# Patient Record
Sex: MALE | Race: WHITE | NOT HISPANIC OR LATINO | Employment: OTHER | URBAN - METROPOLITAN AREA
[De-identification: names, ages, dates, MRNs, and addresses within clinical notes are randomized per-mention and may not be internally consistent; named-entity substitution may affect disease eponyms.]

---

## 2020-01-29 ENCOUNTER — OFFICE VISIT (OUTPATIENT)
Dept: CARDIOLOGY | Facility: CLINIC | Age: 70
End: 2020-01-29

## 2020-01-29 VITALS
DIASTOLIC BLOOD PRESSURE: 68 MMHG | BODY MASS INDEX: 21.07 KG/M2 | SYSTOLIC BLOOD PRESSURE: 106 MMHG | WEIGHT: 159 LBS | HEIGHT: 73 IN | HEART RATE: 85 BPM

## 2020-01-29 DIAGNOSIS — I10 ESSENTIAL HYPERTENSION: Primary | ICD-10-CM

## 2020-01-29 DIAGNOSIS — I44.7 LBBB (LEFT BUNDLE BRANCH BLOCK): ICD-10-CM

## 2020-01-29 DIAGNOSIS — I50.22 CHRONIC SYSTOLIC CONGESTIVE HEART FAILURE, NYHA CLASS 1 (HCC): ICD-10-CM

## 2020-01-29 DIAGNOSIS — I95.9 HYPOTENSION, UNSPECIFIED HYPOTENSION TYPE: ICD-10-CM

## 2020-01-29 DIAGNOSIS — I48.0 PAROXYSMAL ATRIAL FIBRILLATION (HCC): ICD-10-CM

## 2020-01-29 DIAGNOSIS — E78.2 MIXED HYPERLIPIDEMIA: ICD-10-CM

## 2020-01-29 PROCEDURE — 99204 OFFICE O/P NEW MOD 45 MIN: CPT | Performed by: INTERNAL MEDICINE

## 2020-01-29 PROCEDURE — 93000 ELECTROCARDIOGRAM COMPLETE: CPT | Performed by: INTERNAL MEDICINE

## 2020-01-29 RX ORDER — LISINOPRIL 5 MG/1
1 TABLET ORAL DAILY
COMMUNITY
Start: 2019-12-17 | End: 2020-01-29

## 2020-01-29 RX ORDER — SERTRALINE HYDROCHLORIDE 100 MG/1
1 TABLET, FILM COATED ORAL DAILY
COMMUNITY
Start: 2020-01-20

## 2020-01-29 RX ORDER — SERTRALINE HYDROCHLORIDE 25 MG/1
1 TABLET, FILM COATED ORAL DAILY
COMMUNITY
Start: 2020-01-16 | End: 2020-01-29

## 2020-01-29 RX ORDER — MAGNESIUM OXIDE 400 MG/1
1 TABLET ORAL DAILY
COMMUNITY
Start: 2020-01-15

## 2020-01-29 RX ORDER — MIDODRINE HYDROCHLORIDE 5 MG/1
5 TABLET ORAL 2 TIMES DAILY
Qty: 60 TABLET | Refills: 3 | Status: SHIPPED | OUTPATIENT
Start: 2020-01-29 | End: 2021-02-04

## 2020-01-29 RX ORDER — METOPROLOL SUCCINATE 50 MG/1
1 TABLET, EXTENDED RELEASE ORAL DAILY
COMMUNITY
Start: 2020-01-14

## 2020-01-29 RX ORDER — ALBUTEROL SULFATE 90 UG/1
AEROSOL, METERED RESPIRATORY (INHALATION) TAKE AS DIRECTED
COMMUNITY
Start: 2019-12-17

## 2020-01-29 RX ORDER — ASPIRIN 81 MG/1
81 TABLET, CHEWABLE ORAL DAILY
COMMUNITY

## 2020-01-29 RX ORDER — THIAMINE MONONITRATE (VIT B1) 100 MG
100 TABLET ORAL DAILY
COMMUNITY

## 2020-01-29 RX ORDER — ATORVASTATIN CALCIUM 20 MG/1
1 TABLET, FILM COATED ORAL DAILY
COMMUNITY
Start: 2020-01-14

## 2020-01-29 NOTE — PROGRESS NOTES
Date of Office Visit: 2020  Encounter Provider: Dr.Syed Balderrama  Place of Service: [facility] CARDIOLOGY [place]  Patient Name: Viet Worley  :1950  Provider, No Known    Chief Complaint   Patient presents with   • Congestive Heart Failure     consult   • Hyperlipidemia   • Hypertension   • COPD   • Atrial Fibrillation     History of Present Illness:    I am pleased to see  in my office today as a new consultation.    As you know, patient is 70-year-old white gentleman whose past medical history is significant for hyperlipidemia, COPD, alcohol abuse, cardiomyopathy, paroxysmal atrial fibrillation, history of congestive heart failure, who came today to establish his cardiac care with me.    In 2017, patient was living in [place] and he lost his wife who .  Patient developed depression.  He did not take care of himself.  He was admitted in the hospital with congestive heart failure and his work-up showed cardiomyopathy with decreased LV function.  Subsequently patient stopped taking all the medication which was prescribed.  Patient binged himself with alcohol.  For more than a year, patient was having this lifestyle.  In 2019, he was admitted with volume overload and congestive heart failure in [facility] and was treated appropriately.  All of these records are not available.  This is reported by his daughter.  Patient is now living with his daughter.    At present patient does complain of shortness of breath.  Patient has cough and slight wheezing.  No chest pain.  No orthopnea or PND no JVD.  No leg edema.    EKG showed left bundle branch block with sinus rhythm.    Patient has extensive cardiac history but work-up is not available to me.  At this stage, I would recommend to get the record from other hospital.  I would start midodrine 5 mg twice daily for relative hypotension.  Blood pressure monitoring is recommended.  I would consider cardiac work-up including  echocardiogram in future if I do not get the records.        Past Medical History:   Diagnosis Date   • Atrial fibrillation (CMS/HCC)    • Benign essential HTN    • COPD (chronic obstructive pulmonary disease) (CMS/HCC)    • Heart attack (CMS/HCC)    • Hyperlipidemia, mixed          History reviewed. No pertinent surgical history.        Current Outpatient Medications:   •  aspirin 81 MG chewable tablet, Chew 81 mg Daily., Disp: , Rfl:   •  atorvastatin (LIPITOR) 20 MG tablet, 1 tablet Daily., Disp: , Rfl:   •  magnesium oxide (MAG-OX) 400 MG tablet, Take 1 tablet by mouth 2 (Two) Times a Day., Disp: , Rfl:   •  metoprolol succinate XL (TOPROL-XL) 50 MG 24 hr tablet, Take 1 tablet by mouth Daily., Disp: , Rfl:   •  sertraline (ZOLOFT) 100 MG tablet, 1 tablet Daily., Disp: , Rfl:   •  thiamine (VITAMIN B-1) 100 MG tablet, Take 100 mg by mouth Daily., Disp: , Rfl:   •  TRELEGY ELLIPTA 100-62.5-25 MCG/INH aerosol powder , 1 puff Daily., Disp: , Rfl:   •  VENTOLIN  (90 Base) MCG/ACT inhaler, Take As Directed., Disp: , Rfl:   •  midodrine (PROAMATINE) 5 MG tablet, Take 1 tablet by mouth 2 (Two) Times a Day., Disp: 60 tablet, Rfl: 3      Social History     Socioeconomic History   • Marital status:      Spouse name: Not on file   • Number of children: Not on file   • Years of education: Not on file   • Highest education level: Not on file   Tobacco Use   • Smoking status: Former Smoker   Substance and Sexual Activity   • Alcohol use: Not Currently   • Drug use: Not Currently   • Sexual activity: Defer         Review of Systems   Constitution: Negative for chills and fever.   HENT: Negative for ear discharge and nosebleeds.    Eyes: Negative for discharge and redness.   Cardiovascular: Negative for chest pain, orthopnea, palpitations, paroxysmal nocturnal dyspnea and syncope.   Respiratory: Positive for cough and shortness of breath. Negative for wheezing.    Endocrine: Negative for heat intolerance.   Skin:  "Negative for rash.   Musculoskeletal: Positive for arthritis and joint pain. Negative for myalgias.   Gastrointestinal: Negative for abdominal pain, melena, nausea and vomiting.   Genitourinary: Negative for dysuria and hematuria.   Neurological: Negative for dizziness, light-headedness, numbness and tremors.   Psychiatric/Behavioral: Negative for depression. The patient is not nervous/anxious.        Procedures      ECG 12 Lead  Date/Time: 1/29/2020 12:44 PM  Performed by: Mikey Balderrama MD  Authorized by: Mikey Balderrama MD   Previous ECG: no previous ECG available  Rhythm: sinus rhythm  Conduction: left bundle branch block    Clinical impression: normal ECG            ECG 12 Lead    (Results Pending)           Objective:    /68   Pulse 85   Ht 185.4 cm (73\")   Wt 72.1 kg (159 lb)   BMI 20.98 kg/m²         Physical Exam   Constitutional: He is oriented to person, place, and time. He appears well-developed and well-nourished.   HENT:   Head: Normocephalic and atraumatic.   Eyes: No scleral icterus.   Neck: No thyromegaly present.   Cardiovascular: Normal rate, regular rhythm and normal heart sounds. Exam reveals no gallop and no friction rub.   No murmur heard.  Pulmonary/Chest: Effort normal. No respiratory distress. He has wheezes. He has no rales.   Abdominal: There is no tenderness.   Musculoskeletal: He exhibits no edema.   Lymphadenopathy:     He has no cervical adenopathy.   Neurological: He is alert and oriented to person, place, and time.   Skin: No rash noted. No erythema.   Psychiatric: He has a normal mood and affect.           Assessment:       Diagnosis Plan   1. Essential hypertension  ECG 12 Lead    midodrine (PROAMATINE) 5 MG tablet   2. Mixed hyperlipidemia  ECG 12 Lead    midodrine (PROAMATINE) 5 MG tablet   3. Paroxysmal atrial fibrillation (CMS/HCC)  ECG 12 Lead    midodrine (PROAMATINE) 5 MG tablet   4. Chronic systolic congestive heart failure, NYHA class 1 (CMS/HCC)  ECG 12 Lead    " midodrine (PROAMATINE) 5 MG tablet   5. LBBB (left bundle branch block)  ECG 12 Lead    midodrine (PROAMATINE) 5 MG tablet   6. Hypotension, unspecified hypotension type  midodrine (PROAMATINE) 5 MG tablet            Plan:       Start midodrine and monitor the blood pressure at home.  I will try to get the record from other hospital.  I advised family that patient probably would be benefited with pulmonary follow-up.

## 2021-02-03 ENCOUNTER — APPOINTMENT (OUTPATIENT)
Dept: GENERAL RADIOLOGY | Facility: HOSPITAL | Age: 71
End: 2021-02-03

## 2021-02-03 ENCOUNTER — APPOINTMENT (OUTPATIENT)
Dept: CT IMAGING | Facility: HOSPITAL | Age: 71
End: 2021-02-03

## 2021-02-03 ENCOUNTER — HOSPITAL ENCOUNTER (INPATIENT)
Facility: HOSPITAL | Age: 71
LOS: 1 days | Discharge: HOME | End: 2021-02-05
Attending: EMERGENCY MEDICINE | Admitting: INTERNAL MEDICINE

## 2021-02-03 DIAGNOSIS — J96.01 ACUTE RESPIRATORY FAILURE WITH HYPOXEMIA (HCC): ICD-10-CM

## 2021-02-03 DIAGNOSIS — J18.9 PNEUMONIA OF RIGHT LOWER LOBE DUE TO INFECTIOUS ORGANISM: Primary | ICD-10-CM

## 2021-02-03 LAB
ALBUMIN SERPL-MCNC: 4.5 G/DL (ref 3.5–5.2)
ALBUMIN/GLOB SERPL: 1.2 G/DL
ALP SERPL-CCNC: 95 U/L (ref 39–117)
ALT SERPL W P-5'-P-CCNC: 14 U/L (ref 1–41)
ANION GAP SERPL CALCULATED.3IONS-SCNC: 13 MMOL/L (ref 5–15)
APTT PPP: 23.1 SECONDS (ref 24–31)
ARTERIAL PATENCY WRIST A: POSITIVE
AST SERPL-CCNC: 22 U/L (ref 1–40)
ATMOSPHERIC PRESS: ABNORMAL MM[HG]
BASE EXCESS BLDA CALC-SCNC: 2.4 MMOL/L (ref 0–3)
BASOPHILS # BLD AUTO: 0.1 10*3/MM3 (ref 0–0.2)
BASOPHILS NFR BLD AUTO: 0.8 % (ref 0–1.5)
BDY SITE: ABNORMAL
BILIRUB SERPL-MCNC: 0.5 MG/DL (ref 0–1.2)
BUN SERPL-MCNC: 10 MG/DL (ref 8–23)
BUN/CREAT SERPL: 10.8 (ref 7–25)
CALCIUM SPEC-SCNC: 9.4 MG/DL (ref 8.6–10.5)
CHLORIDE SERPL-SCNC: 98 MMOL/L (ref 98–107)
CO2 BLDA-SCNC: 29.4 MMOL/L (ref 22–29)
CO2 SERPL-SCNC: 26 MMOL/L (ref 22–29)
CREAT SERPL-MCNC: 0.93 MG/DL (ref 0.76–1.27)
D DIMER PPP FEU-MCNC: 2.37 MG/L (FEU) (ref 0–0.59)
D-LACTATE SERPL-SCNC: 3 MMOL/L (ref 0.5–2)
DEPRECATED RDW RBC AUTO: 45.1 FL (ref 37–54)
EOSINOPHIL # BLD AUTO: 0.1 10*3/MM3 (ref 0–0.4)
EOSINOPHIL NFR BLD AUTO: 0.6 % (ref 0.3–6.2)
ERYTHROCYTE [DISTWIDTH] IN BLOOD BY AUTOMATED COUNT: 15 % (ref 12.3–15.4)
ETHANOL UR QL: <0.01 %
GFR SERPL CREATININE-BSD FRML MDRD: 80 ML/MIN/1.73
GLOBULIN UR ELPH-MCNC: 3.7 GM/DL
GLUCOSE SERPL-MCNC: 100 MG/DL (ref 65–99)
HCO3 BLDA-SCNC: 28 MMOL/L (ref 21–28)
HCT VFR BLD AUTO: 46.9 % (ref 37.5–51)
HEMODILUTION: NO
HGB BLD-MCNC: 15.3 G/DL (ref 13–17.7)
HOLD SPECIMEN: NORMAL
HOLD SPECIMEN: NORMAL
INHALED O2 CONCENTRATION: 100 %
INR PPP: 0.95 (ref 0.93–1.1)
LIPASE SERPL-CCNC: 27 U/L (ref 13–60)
LYMPHOCYTES # BLD AUTO: 2.9 10*3/MM3 (ref 0.7–3.1)
LYMPHOCYTES NFR BLD AUTO: 20.6 % (ref 19.6–45.3)
MAGNESIUM SERPL-MCNC: 2 MG/DL (ref 1.6–2.4)
MCH RBC QN AUTO: 27.9 PG (ref 26.6–33)
MCHC RBC AUTO-ENTMCNC: 32.7 G/DL (ref 31.5–35.7)
MCV RBC AUTO: 85.3 FL (ref 79–97)
MODALITY: ABNORMAL
MONOCYTES # BLD AUTO: 0.6 10*3/MM3 (ref 0.1–0.9)
MONOCYTES NFR BLD AUTO: 4.5 % (ref 5–12)
NEUTROPHILS NFR BLD AUTO: 10.5 10*3/MM3 (ref 1.7–7)
NEUTROPHILS NFR BLD AUTO: 73.5 % (ref 42.7–76)
NRBC BLD AUTO-RTO: 0 /100 WBC (ref 0–0.2)
NT-PROBNP SERPL-MCNC: 347.5 PG/ML (ref 0–900)
PCO2 BLDA: 45.2 MM HG (ref 35–48)
PH BLDA: 7.4 PH UNITS (ref 7.35–7.45)
PLATELET # BLD AUTO: 229 10*3/MM3 (ref 140–450)
PMV BLD AUTO: 8.7 FL (ref 6–12)
PO2 BLDA: 60.7 MM HG (ref 83–108)
POTASSIUM SERPL-SCNC: 4.7 MMOL/L (ref 3.5–5.2)
PROCALCITONIN SERPL-MCNC: 0.04 NG/ML (ref 0–0.25)
PROT SERPL-MCNC: 8.2 G/DL (ref 6–8.5)
PROTHROMBIN TIME: 10.5 SECONDS (ref 9.6–11.7)
RBC # BLD AUTO: 5.5 10*6/MM3 (ref 4.14–5.8)
SAO2 % BLDCOA: 90.6 % (ref 94–98)
SODIUM SERPL-SCNC: 137 MMOL/L (ref 136–145)
TROPONIN T SERPL-MCNC: <0.01 NG/ML (ref 0–0.03)
WBC # BLD AUTO: 14.3 10*3/MM3 (ref 3.4–10.8)
WHOLE BLOOD HOLD SPECIMEN: NORMAL
WHOLE BLOOD HOLD SPECIMEN: NORMAL

## 2021-02-03 PROCEDURE — 71275 CT ANGIOGRAPHY CHEST: CPT

## 2021-02-03 PROCEDURE — 83880 ASSAY OF NATRIURETIC PEPTIDE: CPT | Performed by: EMERGENCY MEDICINE

## 2021-02-03 PROCEDURE — 83605 ASSAY OF LACTIC ACID: CPT

## 2021-02-03 PROCEDURE — 85379 FIBRIN DEGRADATION QUANT: CPT | Performed by: EMERGENCY MEDICINE

## 2021-02-03 PROCEDURE — 99285 EMERGENCY DEPT VISIT HI MDM: CPT

## 2021-02-03 PROCEDURE — 84484 ASSAY OF TROPONIN QUANT: CPT | Performed by: EMERGENCY MEDICINE

## 2021-02-03 PROCEDURE — 84145 PROCALCITONIN (PCT): CPT | Performed by: EMERGENCY MEDICINE

## 2021-02-03 PROCEDURE — 83690 ASSAY OF LIPASE: CPT | Performed by: EMERGENCY MEDICINE

## 2021-02-03 PROCEDURE — 93005 ELECTROCARDIOGRAM TRACING: CPT | Performed by: EMERGENCY MEDICINE

## 2021-02-03 PROCEDURE — 85610 PROTHROMBIN TIME: CPT | Performed by: EMERGENCY MEDICINE

## 2021-02-03 PROCEDURE — 25010000002 METHYLPREDNISOLONE PER 125 MG: Performed by: EMERGENCY MEDICINE

## 2021-02-03 PROCEDURE — 25010000002 ONDANSETRON PER 1 MG: Performed by: EMERGENCY MEDICINE

## 2021-02-03 PROCEDURE — 82077 ASSAY SPEC XCP UR&BREATH IA: CPT | Performed by: EMERGENCY MEDICINE

## 2021-02-03 PROCEDURE — 25010000002 PIPERACILLIN SOD-TAZOBACTAM PER 1 G: Performed by: EMERGENCY MEDICINE

## 2021-02-03 PROCEDURE — 87040 BLOOD CULTURE FOR BACTERIA: CPT | Performed by: EMERGENCY MEDICINE

## 2021-02-03 PROCEDURE — 83735 ASSAY OF MAGNESIUM: CPT | Performed by: EMERGENCY MEDICINE

## 2021-02-03 PROCEDURE — 82803 BLOOD GASES ANY COMBINATION: CPT

## 2021-02-03 PROCEDURE — 71045 X-RAY EXAM CHEST 1 VIEW: CPT

## 2021-02-03 PROCEDURE — 36600 WITHDRAWAL OF ARTERIAL BLOOD: CPT

## 2021-02-03 PROCEDURE — 85025 COMPLETE CBC W/AUTO DIFF WBC: CPT | Performed by: EMERGENCY MEDICINE

## 2021-02-03 PROCEDURE — 0202U NFCT DS 22 TRGT SARS-COV-2: CPT | Performed by: EMERGENCY MEDICINE

## 2021-02-03 PROCEDURE — 85730 THROMBOPLASTIN TIME PARTIAL: CPT | Performed by: EMERGENCY MEDICINE

## 2021-02-03 PROCEDURE — 94640 AIRWAY INHALATION TREATMENT: CPT

## 2021-02-03 PROCEDURE — 80053 COMPREHEN METABOLIC PANEL: CPT | Performed by: EMERGENCY MEDICINE

## 2021-02-03 PROCEDURE — 25010000002 VANCOMYCIN 10 G RECONSTITUTED SOLUTION: Performed by: EMERGENCY MEDICINE

## 2021-02-03 RX ORDER — ACETAMINOPHEN 500 MG
1000 TABLET ORAL ONCE
Status: COMPLETED | OUTPATIENT
Start: 2021-02-03 | End: 2021-02-03

## 2021-02-03 RX ORDER — ALBUTEROL SULFATE 90 UG/1
2 AEROSOL, METERED RESPIRATORY (INHALATION) ONCE
Status: COMPLETED | OUTPATIENT
Start: 2021-02-03 | End: 2021-02-03

## 2021-02-03 RX ORDER — METHYLPREDNISOLONE SODIUM SUCCINATE 125 MG/2ML
125 INJECTION, POWDER, LYOPHILIZED, FOR SOLUTION INTRAMUSCULAR; INTRAVENOUS ONCE
Status: COMPLETED | OUTPATIENT
Start: 2021-02-03 | End: 2021-02-03

## 2021-02-03 RX ORDER — SODIUM CHLORIDE 0.9 % (FLUSH) 0.9 %
10 SYRINGE (ML) INJECTION AS NEEDED
Status: DISCONTINUED | OUTPATIENT
Start: 2021-02-03 | End: 2021-02-05 | Stop reason: HOSPADM

## 2021-02-03 RX ORDER — ALBUTEROL SULFATE 2.5 MG/3ML
2.5 SOLUTION RESPIRATORY (INHALATION) ONCE
Status: DISCONTINUED | OUTPATIENT
Start: 2021-02-03 | End: 2021-02-03

## 2021-02-03 RX ORDER — ONDANSETRON 2 MG/ML
4 INJECTION INTRAMUSCULAR; INTRAVENOUS ONCE
Status: COMPLETED | OUTPATIENT
Start: 2021-02-03 | End: 2021-02-03

## 2021-02-03 RX ADMIN — VANCOMYCIN HYDROCHLORIDE 1500 MG: 10 INJECTION, POWDER, LYOPHILIZED, FOR SOLUTION INTRAVENOUS at 23:18

## 2021-02-03 RX ADMIN — SODIUM CHLORIDE 2301 ML: 9 INJECTION, SOLUTION INTRAVENOUS at 23:33

## 2021-02-03 RX ADMIN — ALBUTEROL SULFATE 2 PUFF: 90 AEROSOL, METERED RESPIRATORY (INHALATION) at 23:09

## 2021-02-03 RX ADMIN — ONDANSETRON 4 MG: 2 INJECTION, SOLUTION INTRAMUSCULAR; INTRAVENOUS at 23:07

## 2021-02-03 RX ADMIN — PIPERACILLIN AND TAZOBACTAM 3.38 G: 3; .375 INJECTION, POWDER, LYOPHILIZED, FOR SOLUTION INTRAVENOUS at 23:07

## 2021-02-03 RX ADMIN — ACETAMINOPHEN 1000 MG: 500 TABLET, FILM COATED ORAL at 23:07

## 2021-02-03 RX ADMIN — METHYLPREDNISOLONE SODIUM SUCCINATE 125 MG: 125 INJECTION, POWDER, FOR SOLUTION INTRAMUSCULAR; INTRAVENOUS at 23:07

## 2021-02-04 PROBLEM — I10 ESSENTIAL HYPERTENSION: Chronic | Status: ACTIVE | Noted: 2020-01-29

## 2021-02-04 PROBLEM — I50.22 CHRONIC SYSTOLIC CONGESTIVE HEART FAILURE, NYHA CLASS 1 (HCC): Chronic | Status: ACTIVE | Noted: 2020-01-29

## 2021-02-04 PROBLEM — U07.1 COVID-19 VIRUS DETECTED: Status: ACTIVE | Noted: 2021-02-04

## 2021-02-04 PROBLEM — J69.0 ASPIRATION PNEUMONIA DUE TO VOMITUS (HCC): Status: ACTIVE | Noted: 2021-02-04

## 2021-02-04 PROBLEM — J44.9 COPD (CHRONIC OBSTRUCTIVE PULMONARY DISEASE) (HCC): Chronic | Status: ACTIVE | Noted: 2021-02-04

## 2021-02-04 PROBLEM — F41.8 ANXIETY ASSOCIATED WITH DEPRESSION: Chronic | Status: ACTIVE | Noted: 2021-02-04

## 2021-02-04 PROBLEM — E78.2 MIXED HYPERLIPIDEMIA: Chronic | Status: ACTIVE | Noted: 2020-01-29

## 2021-02-04 PROBLEM — I48.0 PAROXYSMAL ATRIAL FIBRILLATION (HCC): Chronic | Status: ACTIVE | Noted: 2020-01-29

## 2021-02-04 LAB
ANION GAP SERPL CALCULATED.3IONS-SCNC: 9 MMOL/L (ref 5–15)
B PARAPERT DNA SPEC QL NAA+PROBE: NOT DETECTED
B PERT DNA SPEC QL NAA+PROBE: NOT DETECTED
BASOPHILS # BLD AUTO: 0 10*3/MM3 (ref 0–0.2)
BASOPHILS NFR BLD AUTO: 0.3 % (ref 0–1.5)
BILIRUB UR QL STRIP: NEGATIVE
BUN SERPL-MCNC: 9 MG/DL (ref 8–23)
BUN/CREAT SERPL: 10.8 (ref 7–25)
C PNEUM DNA NPH QL NAA+NON-PROBE: NOT DETECTED
CALCIUM SPEC-SCNC: 8 MG/DL (ref 8.6–10.5)
CHLORIDE SERPL-SCNC: 105 MMOL/L (ref 98–107)
CLARITY UR: CLEAR
CO2 SERPL-SCNC: 23 MMOL/L (ref 22–29)
COLOR UR: YELLOW
CREAT SERPL-MCNC: 0.83 MG/DL (ref 0.76–1.27)
D-LACTATE SERPL-SCNC: 2.6 MMOL/L (ref 0.5–2)
DEPRECATED RDW RBC AUTO: 45.1 FL (ref 37–54)
EOSINOPHIL # BLD AUTO: 0 10*3/MM3 (ref 0–0.4)
EOSINOPHIL NFR BLD AUTO: 0.1 % (ref 0.3–6.2)
ERYTHROCYTE [DISTWIDTH] IN BLOOD BY AUTOMATED COUNT: 15.2 % (ref 12.3–15.4)
FLUAV SUBTYP SPEC NAA+PROBE: NOT DETECTED
FLUBV RNA ISLT QL NAA+PROBE: NOT DETECTED
GFR SERPL CREATININE-BSD FRML MDRD: 91 ML/MIN/1.73
GLUCOSE SERPL-MCNC: 117 MG/DL (ref 65–99)
GLUCOSE UR STRIP-MCNC: NEGATIVE MG/DL
HADV DNA SPEC NAA+PROBE: NOT DETECTED
HCOV 229E RNA SPEC QL NAA+PROBE: NOT DETECTED
HCOV HKU1 RNA SPEC QL NAA+PROBE: NOT DETECTED
HCOV NL63 RNA SPEC QL NAA+PROBE: NOT DETECTED
HCOV OC43 RNA SPEC QL NAA+PROBE: NOT DETECTED
HCT VFR BLD AUTO: 39.5 % (ref 37.5–51)
HGB BLD-MCNC: 13 G/DL (ref 13–17.7)
HGB UR QL STRIP.AUTO: NEGATIVE
HMPV RNA NPH QL NAA+NON-PROBE: NOT DETECTED
HPIV1 RNA SPEC QL NAA+PROBE: NOT DETECTED
HPIV2 RNA SPEC QL NAA+PROBE: NOT DETECTED
HPIV3 RNA NPH QL NAA+PROBE: NOT DETECTED
HPIV4 P GENE NPH QL NAA+PROBE: NOT DETECTED
KETONES UR QL STRIP: ABNORMAL
L PNEUMO1 AG UR QL IA: NEGATIVE
LACTATE HOLD SPECIMEN: NORMAL
LEUKOCYTE ESTERASE UR QL STRIP.AUTO: NEGATIVE
LYMPHOCYTES # BLD AUTO: 0.5 10*3/MM3 (ref 0.7–3.1)
LYMPHOCYTES NFR BLD AUTO: 4 % (ref 19.6–45.3)
M PNEUMO IGG SER IA-ACNC: NOT DETECTED
MCH RBC QN AUTO: 27.8 PG (ref 26.6–33)
MCHC RBC AUTO-ENTMCNC: 33 G/DL (ref 31.5–35.7)
MCV RBC AUTO: 84.1 FL (ref 79–97)
MONOCYTES # BLD AUTO: 0.2 10*3/MM3 (ref 0.1–0.9)
MONOCYTES NFR BLD AUTO: 2 % (ref 5–12)
MRSA DNA SPEC QL NAA+PROBE: NORMAL
NEUTROPHILS NFR BLD AUTO: 11.3 10*3/MM3 (ref 1.7–7)
NEUTROPHILS NFR BLD AUTO: 93.6 % (ref 42.7–76)
NITRITE UR QL STRIP: NEGATIVE
NRBC BLD AUTO-RTO: 0 /100 WBC (ref 0–0.2)
PH UR STRIP.AUTO: 5.5 [PH] (ref 5–8)
PLATELET # BLD AUTO: 180 10*3/MM3 (ref 140–450)
PMV BLD AUTO: 8.6 FL (ref 6–12)
POTASSIUM SERPL-SCNC: 4.1 MMOL/L (ref 3.5–5.2)
PROT UR QL STRIP: NEGATIVE
QT INTERVAL: 340 MS
RBC # BLD AUTO: 4.7 10*6/MM3 (ref 4.14–5.8)
RHINOVIRUS RNA SPEC NAA+PROBE: NOT DETECTED
RSV RNA NPH QL NAA+NON-PROBE: NOT DETECTED
S PNEUM AG SPEC QL LA: NEGATIVE
SARS-COV-2 RNA NPH QL NAA+NON-PROBE: NOT DETECTED
SODIUM SERPL-SCNC: 137 MMOL/L (ref 136–145)
SP GR UR STRIP: 1.05 (ref 1–1.03)
UROBILINOGEN UR QL STRIP: ABNORMAL
WBC # BLD AUTO: 12.1 10*3/MM3 (ref 3.4–10.8)

## 2021-02-04 PROCEDURE — 25010000002 PIPERACILLIN SOD-TAZOBACTAM PER 1 G: Performed by: STUDENT IN AN ORGANIZED HEALTH CARE EDUCATION/TRAINING PROGRAM

## 2021-02-04 PROCEDURE — 87899 AGENT NOS ASSAY W/OPTIC: CPT | Performed by: STUDENT IN AN ORGANIZED HEALTH CARE EDUCATION/TRAINING PROGRAM

## 2021-02-04 PROCEDURE — 94799 UNLISTED PULMONARY SVC/PX: CPT

## 2021-02-04 PROCEDURE — 25010000003 AMPICILLIN-SULBACTAM PER 1.5 G: Performed by: INTERNAL MEDICINE

## 2021-02-04 PROCEDURE — 87641 MR-STAPH DNA AMP PROBE: CPT | Performed by: STUDENT IN AN ORGANIZED HEALTH CARE EDUCATION/TRAINING PROGRAM

## 2021-02-04 PROCEDURE — 85025 COMPLETE CBC W/AUTO DIFF WBC: CPT | Performed by: STUDENT IN AN ORGANIZED HEALTH CARE EDUCATION/TRAINING PROGRAM

## 2021-02-04 PROCEDURE — 81003 URINALYSIS AUTO W/O SCOPE: CPT | Performed by: EMERGENCY MEDICINE

## 2021-02-04 PROCEDURE — 83605 ASSAY OF LACTIC ACID: CPT

## 2021-02-04 PROCEDURE — 99223 1ST HOSP IP/OBS HIGH 75: CPT | Performed by: INTERNAL MEDICINE

## 2021-02-04 PROCEDURE — 94640 AIRWAY INHALATION TREATMENT: CPT

## 2021-02-04 PROCEDURE — 25010000002 ENOXAPARIN PER 10 MG: Performed by: STUDENT IN AN ORGANIZED HEALTH CARE EDUCATION/TRAINING PROGRAM

## 2021-02-04 PROCEDURE — 0 IOPAMIDOL PER 1 ML: Performed by: EMERGENCY MEDICINE

## 2021-02-04 PROCEDURE — 80048 BASIC METABOLIC PNL TOTAL CA: CPT | Performed by: STUDENT IN AN ORGANIZED HEALTH CARE EDUCATION/TRAINING PROGRAM

## 2021-02-04 RX ORDER — ATORVASTATIN CALCIUM 20 MG/1
20 TABLET, FILM COATED ORAL DAILY
Status: DISCONTINUED | OUTPATIENT
Start: 2021-02-04 | End: 2021-02-05 | Stop reason: HOSPADM

## 2021-02-04 RX ORDER — DOCUSATE SODIUM 100 MG/1
100 CAPSULE, LIQUID FILLED ORAL 2 TIMES DAILY PRN
Status: DISCONTINUED | OUTPATIENT
Start: 2021-02-04 | End: 2021-02-05 | Stop reason: HOSPADM

## 2021-02-04 RX ORDER — ASPIRIN 81 MG/1
81 TABLET, CHEWABLE ORAL DAILY
Status: DISCONTINUED | OUTPATIENT
Start: 2021-02-04 | End: 2021-02-05 | Stop reason: HOSPADM

## 2021-02-04 RX ORDER — MAGNESIUM SULFATE HEPTAHYDRATE 40 MG/ML
2 INJECTION, SOLUTION INTRAVENOUS AS NEEDED
Status: DISCONTINUED | OUTPATIENT
Start: 2021-02-04 | End: 2021-02-05 | Stop reason: HOSPADM

## 2021-02-04 RX ORDER — POTASSIUM CHLORIDE 1.5 G/1.77G
40 POWDER, FOR SOLUTION ORAL AS NEEDED
Status: DISCONTINUED | OUTPATIENT
Start: 2021-02-04 | End: 2021-02-05 | Stop reason: HOSPADM

## 2021-02-04 RX ORDER — CHOLECALCIFEROL (VITAMIN D3) 125 MCG
5 CAPSULE ORAL NIGHTLY PRN
Status: DISCONTINUED | OUTPATIENT
Start: 2021-02-04 | End: 2021-02-05 | Stop reason: HOSPADM

## 2021-02-04 RX ORDER — SODIUM CHLORIDE 0.9 % (FLUSH) 0.9 %
10 SYRINGE (ML) INJECTION EVERY 12 HOURS SCHEDULED
Status: DISCONTINUED | OUTPATIENT
Start: 2021-02-04 | End: 2021-02-05 | Stop reason: HOSPADM

## 2021-02-04 RX ORDER — ACETAMINOPHEN 325 MG/1
650 TABLET ORAL EVERY 4 HOURS PRN
Status: DISCONTINUED | OUTPATIENT
Start: 2021-02-04 | End: 2021-02-05 | Stop reason: HOSPADM

## 2021-02-04 RX ORDER — METOPROLOL SUCCINATE 50 MG/1
50 TABLET, EXTENDED RELEASE ORAL DAILY
Status: DISCONTINUED | OUTPATIENT
Start: 2021-02-04 | End: 2021-02-05 | Stop reason: HOSPADM

## 2021-02-04 RX ORDER — ONDANSETRON 4 MG/1
4 TABLET, FILM COATED ORAL EVERY 6 HOURS PRN
Status: DISCONTINUED | OUTPATIENT
Start: 2021-02-04 | End: 2021-02-05 | Stop reason: HOSPADM

## 2021-02-04 RX ORDER — MAGNESIUM SULFATE 1 G/100ML
1 INJECTION INTRAVENOUS AS NEEDED
Status: DISCONTINUED | OUTPATIENT
Start: 2021-02-04 | End: 2021-02-05 | Stop reason: HOSPADM

## 2021-02-04 RX ORDER — POTASSIUM CHLORIDE 20 MEQ/1
40 TABLET, EXTENDED RELEASE ORAL AS NEEDED
Status: DISCONTINUED | OUTPATIENT
Start: 2021-02-04 | End: 2021-02-05 | Stop reason: HOSPADM

## 2021-02-04 RX ORDER — NITROGLYCERIN 0.4 MG/1
0.4 TABLET SUBLINGUAL
Status: DISCONTINUED | OUTPATIENT
Start: 2021-02-04 | End: 2021-02-05 | Stop reason: HOSPADM

## 2021-02-04 RX ORDER — IPRATROPIUM BROMIDE AND ALBUTEROL SULFATE 2.5; .5 MG/3ML; MG/3ML
3 SOLUTION RESPIRATORY (INHALATION) EVERY 4 HOURS PRN
Status: DISCONTINUED | OUTPATIENT
Start: 2021-02-04 | End: 2021-02-05 | Stop reason: HOSPADM

## 2021-02-04 RX ORDER — ONDANSETRON 2 MG/ML
4 INJECTION INTRAMUSCULAR; INTRAVENOUS EVERY 6 HOURS PRN
Status: DISCONTINUED | OUTPATIENT
Start: 2021-02-04 | End: 2021-02-05 | Stop reason: HOSPADM

## 2021-02-04 RX ORDER — ALBUTEROL SULFATE 90 UG/1
2 AEROSOL, METERED RESPIRATORY (INHALATION) EVERY 6 HOURS PRN
Status: DISCONTINUED | OUTPATIENT
Start: 2021-02-04 | End: 2021-02-05 | Stop reason: HOSPADM

## 2021-02-04 RX ORDER — ACETAMINOPHEN 160 MG/5ML
650 SOLUTION ORAL EVERY 4 HOURS PRN
Status: DISCONTINUED | OUTPATIENT
Start: 2021-02-04 | End: 2021-02-05 | Stop reason: HOSPADM

## 2021-02-04 RX ORDER — POTASSIUM CHLORIDE 7.45 MG/ML
10 INJECTION INTRAVENOUS
Status: DISCONTINUED | OUTPATIENT
Start: 2021-02-04 | End: 2021-02-05 | Stop reason: HOSPADM

## 2021-02-04 RX ORDER — ACETAMINOPHEN 650 MG/1
650 SUPPOSITORY RECTAL EVERY 4 HOURS PRN
Status: DISCONTINUED | OUTPATIENT
Start: 2021-02-04 | End: 2021-02-05 | Stop reason: HOSPADM

## 2021-02-04 RX ORDER — BUDESONIDE AND FORMOTEROL FUMARATE DIHYDRATE 80; 4.5 UG/1; UG/1
2 AEROSOL RESPIRATORY (INHALATION)
Status: DISCONTINUED | OUTPATIENT
Start: 2021-02-04 | End: 2021-02-05 | Stop reason: HOSPADM

## 2021-02-04 RX ORDER — SERTRALINE HYDROCHLORIDE 100 MG/1
100 TABLET, FILM COATED ORAL DAILY
Status: DISCONTINUED | OUTPATIENT
Start: 2021-02-04 | End: 2021-02-05 | Stop reason: HOSPADM

## 2021-02-04 RX ORDER — GUAIFENESIN 600 MG/1
1200 TABLET, EXTENDED RELEASE ORAL EVERY 12 HOURS SCHEDULED
Status: DISCONTINUED | OUTPATIENT
Start: 2021-02-04 | End: 2021-02-05 | Stop reason: HOSPADM

## 2021-02-04 RX ORDER — SODIUM CHLORIDE 0.9 % (FLUSH) 0.9 %
10 SYRINGE (ML) INJECTION AS NEEDED
Status: DISCONTINUED | OUTPATIENT
Start: 2021-02-04 | End: 2021-02-05 | Stop reason: HOSPADM

## 2021-02-04 RX ADMIN — GUAIFENESIN 1200 MG: 600 TABLET, EXTENDED RELEASE ORAL at 09:03

## 2021-02-04 RX ADMIN — IPRATROPIUM BROMIDE 0.5 MG: 0.5 SOLUTION RESPIRATORY (INHALATION) at 12:46

## 2021-02-04 RX ADMIN — Medication: at 12:35

## 2021-02-04 RX ADMIN — SERTRALINE HYDROCHLORIDE 100 MG: 100 TABLET ORAL at 08:58

## 2021-02-04 RX ADMIN — Medication 10 ML: at 20:20

## 2021-02-04 RX ADMIN — Medication 10 ML: at 03:51

## 2021-02-04 RX ADMIN — IPRATROPIUM BROMIDE 0.5 MG: 0.5 SOLUTION RESPIRATORY (INHALATION) at 08:22

## 2021-02-04 RX ADMIN — BUDESONIDE AND FORMOTEROL FUMARATE DIHYDRATE 2 PUFF: 80; 4.5 AEROSOL RESPIRATORY (INHALATION) at 08:22

## 2021-02-04 RX ADMIN — ENOXAPARIN SODIUM 40 MG: 40 INJECTION SUBCUTANEOUS at 15:52

## 2021-02-04 RX ADMIN — METOPROLOL SUCCINATE 50 MG: 50 TABLET, EXTENDED RELEASE ORAL at 08:58

## 2021-02-04 RX ADMIN — Medication 1 APPLICATION: at 20:20

## 2021-02-04 RX ADMIN — GUAIFENESIN 1200 MG: 600 TABLET, EXTENDED RELEASE ORAL at 20:20

## 2021-02-04 RX ADMIN — SODIUM CHLORIDE 3 G: 900 INJECTION, SOLUTION INTRAVENOUS at 10:56

## 2021-02-04 RX ADMIN — IOPAMIDOL 100 ML: 755 INJECTION, SOLUTION INTRAVENOUS at 00:15

## 2021-02-04 RX ADMIN — IPRATROPIUM BROMIDE 0.5 MG: 0.5 SOLUTION RESPIRATORY (INHALATION) at 23:13

## 2021-02-04 RX ADMIN — Medication 10 ML: at 08:59

## 2021-02-04 RX ADMIN — SODIUM CHLORIDE 3 G: 900 INJECTION, SOLUTION INTRAVENOUS at 15:53

## 2021-02-04 RX ADMIN — GUAIFENESIN 1200 MG: 600 TABLET, EXTENDED RELEASE ORAL at 03:19

## 2021-02-04 RX ADMIN — SODIUM CHLORIDE 3 G: 900 INJECTION, SOLUTION INTRAVENOUS at 21:16

## 2021-02-04 RX ADMIN — ASPIRIN 81 MG CHEWABLE TABLET 81 MG: 81 TABLET CHEWABLE at 08:58

## 2021-02-04 RX ADMIN — PIPERACILLIN AND TAZOBACTAM 3.38 G: 3; .375 INJECTION, POWDER, LYOPHILIZED, FOR SOLUTION INTRAVENOUS at 04:03

## 2021-02-04 RX ADMIN — ATORVASTATIN CALCIUM 20 MG: 20 TABLET, FILM COATED ORAL at 08:58

## 2021-02-04 NOTE — PLAN OF CARE
Goal Outcome Evaluation:  Plan of Care Reviewed With: patient  Progress: improving  Outcome Summary: pt is on 5L n.c.with sat @ 97%, pt is resting in his bed with no complaints of pain or SOA

## 2021-02-04 NOTE — PROGRESS NOTES
"Pharmacy Antimicrobial Dosing Service    Subjective:  Viet Worley is a 71 y.o.male admitted with PNA. Pharmacy has been consulted to dose Vancomycin for possible PNA.        Assessment/Plan    1. Day #1 Vancomycin: Goal -600 mcg*h/mL. Vanc 1500mg (~19.5 mg/kg TBW) given in ED. Will schedule vanc 1000mg (~13 mg/kg TBW) q12h. Will obtain levels prior to fourth dose. MRSA nasal swab ordered for PNA.     Peak 2/5 at 0300 and trough 2/5 at 1000.    2. Day # 1 Piperacillin/Tazobactam: 3.375g IV q8h for estCrCl > 20 mL/min.    Will continue to monitor drug levels, renal function, culture and sensitivities, and patient clinical status.       Objective:  Relevant clinical data and objective history reviewed:  177.8 cm (70\")   76.7 kg (169 lb)   Ideal body weight: 73 kg (160 lb 15 oz)  Adjusted ideal body weight: 74.5 kg (164 lb 2.6 oz)  Body mass index is 24.25 kg/m².        Results from last 7 days   Lab Units 02/03/21  2241   CREATININE mg/dL 0.93     Estimated Creatinine Clearance: 77.9 mL/min (by C-G formula based on SCr of 0.93 mg/dL).  No intake/output data recorded.    Results from last 7 days   Lab Units 02/03/21  2241   WBC 10*3/mm3 14.30*     Temperature    02/03/21 2226   Temp: 99 °F (37.2 °C)     Baseline culture/source/susceptibility:  Microbiology Results (last 10 days)       Procedure Component Value - Date/Time    Respiratory Panel PCR w/COVID-19(SARS-CoV-2) JULIO/HERMAN/JOE/PAD/COR/MAD/[facility]MARCO In-House, NP Swab in UTM/VTM, 3-4 HR TAT - Swab, Nasopharynx [[id number]]  (Normal) Collected: 02/03/21 2305    Lab Status: Final result Specimen: Swab from Nasopharynx Updated: 02/04/21 0011     ADENOVIRUS, PCR Not Detected     Coronavirus 229E Not Detected     Coronavirus HKU1 Not Detected     Coronavirus NL63 Not Detected     Coronavirus OC43 Not Detected     COVID19 Not Detected     Human Metapneumovirus Not Detected     Human Rhinovirus/Enterovirus Not Detected     Influenza A PCR Not Detected     Influenza B PCR Not " Detected     Parainfluenza Virus 1 Not Detected     Parainfluenza Virus 2 Not Detected     Parainfluenza Virus 3 Not Detected     Parainfluenza Virus 4 Not Detected     RSV, PCR Not Detected     Bordetella pertussis pcr Not Detected     Bordetella parapertussis PCR Not Detected     Chlamydophila pneumoniae PCR Not Detected     Mycoplasma pneumo by PCR Not Detected    Narrative:      Fact sheet for providers: https://docs.[employer]/wp-content/uploads/YED4747-9307-QB0.1-EUA-Provider-Fact-Sheet-3.pdf    Fact sheet for patients: https://docs.[employer]/wp-content/uploads/PBX0874-6094-WK9.1-EUA-Patient-Fact-Sheet-1.pdf    Test performed by PCR.             Anti-Infectives (From admission, onward)      Ordered     Dose/Rate Route Frequency Start Stop    02/04/21 0140  !Vancomycin Level Draw Needed     Ordering Provider: Judi Dao APRN     Does not apply Once 02/05/21 1000      02/04/21 0140  !Vancomycin Level Draw Needed     Ordering Provider: Judi Dao APRN     Does not apply Once 02/05/21 0300      02/04/21 0140  vancomycin (VANCOCIN) 1,000 mg in sodium chloride 0.9 % 250 mL IVPB     Ordering Provider: Judi Dao APRN    1,000 mg  over 60 Minutes Intravenous Every 12 Hours 02/04/21 1100 02/07/21 1059    02/04/21 0127  piperacillin-tazobactam (ZOSYN) IVPB 3.375 g in 100 mL NS (CD)     Ordering Provider: Judi Dao APRN    3.375 g  over 4 Hours Intravenous Every 8 Hours 02/04/21 0500 02/07/21 0459    02/04/21 0127  Pharmacy to dose vancomycin     Ordering Provider: Judi Dao APRN     Does not apply Continuous PRN 02/04/21 0127 02/07/21 0126    02/03/21 2247  piperacillin-tazobactam (ZOSYN) IVPB 3.375 g in 100 mL NS (CD)     Ordering Provider: Alf De La Rosa MD    3.375 g  over 30 Minutes Intravenous Once 02/03/21 2249 02/03/21 2337    02/03/21 2247  vancomycin 1500 mg/500 mL 0.9% NS IVPB ([facility])     Ordering Provider: Alf De La Rosa MD    20 mg/kg × 76.7 kg  over 90 Minutes  Intravenous Once 02/03/21 2249 02/04/21 0142            Onelia Lucas, [facility]  02/04/21 02:05 EST

## 2021-02-04 NOTE — NURSING NOTE
Pt seen today via chart review for moisture associates skin damage /stage 2 pressure injury to coccyx/perirectal area.  Pt appears to have linear excorition to area with some signs of friction/thickened/discolored skin to bilat buttocks.  Recommend zinc moisture barrier to area bid and prn. No silicone foam dressing at this time. Recommend continue skin at risk pressure injury prevention strategies.

## 2021-02-04 NOTE — H&P
[facility] Medicine Services      Patient Name: Viet Worley  : 1950  MRN: [id number]  Primary Care Physician: Spencer, No Known  Date of admission: 2/3/2021    Patient Care Team:  Provider, No Known as PCP - General          Subjective   History Present Illness     Chief Complaint:   Chief Complaint   Patient presents with   • Chest Pain       Mr. Worley is a 71 y.o. male who presents to [facility] ED with a history of atrial fibrillation, hypertension, hyperlipidemia, COPD, and MI complaining of aspiration.  Patient states earlier this evening he was eating dinner with his daughter when all of a sudden he began to cough.  He states he was unable to make the coughing stop and he became short of air.  Patient denies chest pain, fever, hematochezia, and hematemesis there were no known relieving factors noted at the time.  And patient presented to the emergency department.    In the ED, troponin negative, .5 lactate 3.0, D-dimer 2.37, PTT 23.1, WBCs 14.3.  ABG shows pH 7.40, CO2 45.2, O2 60.7, HCO3 28.0, base excess 2.4 on 100% nonrebreather.  Blood cultures pending.  Blood alcohol negative.  EKG shows sinus tachycardia with rate of 116.  Respiratory viral panel with COVID-19 negative.  CT chest PE protocol shows right lower lobe pneumonia, aortic valve calcifications, moderate pulmonary emphysema but no evidence of pulmonary embolus.  Patient admitted for further evaluation and treatment.        Review of Systems   Constitution: Negative for chills and fever.   Cardiovascular: Negative for chest pain.   Respiratory: Positive for cough and shortness of breath. Negative for sputum production.    Musculoskeletal: Myalgias:      Gastrointestinal: Negative for abdominal pain, hematemesis, hematochezia, melena, nausea and vomiting.   Genitourinary: Negative for dysuria.   Neurological: Excessive daytime sleepiness:      All other systems reviewed and are  negative.          Personal History     Past Medical History:   Past Medical History:   Diagnosis Date   • Atrial fibrillation (CMS/HCC)    • Benign essential HTN    • COPD (chronic obstructive pulmonary disease) (CMS/HCC)    • COVID-19 virus detected 2/4/2021   • Heart attack (CMS/HCC)    • Hyperlipidemia, mixed        Surgical History:    History reviewed. No pertinent surgical history.      Family History: family history includes No Known Problems in his father and mother. Otherwise pertinent FHx was reviewed and unremarkable.     Social History:  reports that he quit smoking about 19 months ago. He has quit using smokeless tobacco. He reports previous alcohol use. He reports previous drug use. Drug: Marijuana.      Medications:  Prior to Admission medications    Medication Sig Start Date End Date Taking? Authorizing Provider   aspirin 81 MG chewable tablet Chew 81 mg Daily.   Yes Donna Palmer MD   atorvastatin (LIPITOR) 20 MG tablet 1 tablet Daily. 1/14/20  Yes Donna Palmer MD   magnesium oxide (MAG-OX) 400 MG tablet Take 1 tablet by mouth Daily. 1/15/20  Yes Donna Palmer MD   metoprolol succinate XL (TOPROL-XL) 50 MG 24 hr tablet Take 1 tablet by mouth Daily. 1/14/20  Yes Donna Palmer MD   sertraline (ZOLOFT) 100 MG tablet 1 tablet Daily. 1/20/20  Yes Donna Palmer MD   thiamine (VITAMIN B-1) 100 MG tablet Take 100 mg by mouth Daily.   Yes Provider, Historical, MD   TRELEGY ELLIPTA 100-62.5-25 MCG/INH aerosol powder  1 puff Daily. 1/16/20  Yes Donna Palmer MD   VENTOLIN  (90 Base) MCG/ACT inhaler Take As Directed. 12/17/19  Yes Donna Palmer MD   midodrine (PROAMATINE) 5 MG tablet Take 1 tablet by mouth 2 (Two) Times a Day. 1/29/20   Mikey Balderrama MD       Allergies:  No Known Allergies    Objective   Objective     Vital Signs  Temp:  [98.1 °F (36.7 °C)-99 °F (37.2 °C)] 98.1 °F (36.7 °C)  Heart Rate:  [100-131] 100  Resp:  [21-24] 21  BP:  ()/() 105/46  SpO2:  [85 %-99 %] 96 %  on  Flow (L/min):  [5-15] 5;   Device (Oxygen Therapy): nasal cannula  Body mass index is 21.94 kg/m².    Physical Exam  Vitals signs reviewed.   Constitutional:       Appearance: Normal appearance.   HENT:      Head: Normocephalic and atraumatic.      Nose: Nose normal.      Mouth/Throat:      Mouth: Mucous membranes are moist.      Pharynx: Oropharynx is clear.   Eyes:      Conjunctiva/sclera: Conjunctivae normal.      Pupils: Pupils are equal, round, and reactive to light.   Neck:      Musculoskeletal: Normal range of motion.   Cardiovascular:      Rate and Rhythm: Normal rate and regular rhythm.      Pulses: Normal pulses.      Heart sounds: Normal heart sounds.   Pulmonary:      Effort: Pulmonary effort is normal.      Comments: Left lung with coarse rhonchi throughout; right lung clear  Abdominal:      General: Bowel sounds are normal.      Palpations: Abdomen is soft.   Musculoskeletal: Normal range of motion.   Skin:     General: Skin is warm and dry.      Capillary Refill: Capillary refill takes less than 2 seconds.   Neurological:      General: No focal deficit present.      Mental Status: He is alert and oriented to person, place, and time.   Psychiatric:         Mood and Affect: Mood normal.         Behavior: Behavior normal.         Thought Content: Thought content normal.         Judgment: Judgment normal.         Results Review:  I have personally reviewed most recent cardiac tracings, lab results, microbiology results and radiology images and interpretations and agree with findings, most notably: Radiology results.    Results from last 7 days   Lab Units 02/04/21  0226 02/03/21  2241   WBC 10*3/mm3 12.10* 14.30*   HEMOGLOBIN g/dL 13.0 15.3   HEMATOCRIT % 39.5 46.9   PLATELETS 10*3/mm3 180 229   INR   --  0.95     Results from last 7 days   Lab Units 02/04/21  0226  02/03/21  2241   SODIUM mmol/L 137  --  137   POTASSIUM mmol/L 4.1  --  4.7   CHLORIDE  mmol/L 105  --  98   CO2 mmol/L 23.0  --  26.0   BUN mg/dL 9  --  10   CREATININE mg/dL 0.83  --  0.93   GLUCOSE mg/dL 117*  --  100*   CALCIUM mg/dL 8.0*  --  9.4   ALT (SGPT) U/L  --   --  14   AST (SGOT) U/L  --   --  22   TROPONIN T ng/mL  --   --  <0.010   PROBNP pg/mL  --   --  347.5   LACTATE mmol/L 2.6*   < >  --    PROCALCITONIN ng/mL  --   --  0.04    < > = values in this interval not displayed.     Estimated Creatinine Clearance: 88.2 mL/min (by C-G formula based on SCr of 0.83 mg/dL).  Brief Urine Lab Results     None          Microbiology Results (last 10 days)     Procedure Component Value - Date/Time    Respiratory Panel PCR w/COVID-19(SARS-CoV-2) JULIO/HERMAN/JOE/PAD/COR/MAD/DEMARCO In-House, NP Swab in UTM/VTM, 3-4 HR TAT - Swab, Nasopharynx [[id number]]  (Normal) Collected: 02/03/21 2305    Lab Status: Final result Specimen: Swab from Nasopharynx Updated: 02/04/21 0011     ADENOVIRUS, PCR Not Detected     Coronavirus 229E Not Detected     Coronavirus HKU1 Not Detected     Coronavirus NL63 Not Detected     Coronavirus OC43 Not Detected     COVID19 Not Detected     Human Metapneumovirus Not Detected     Human Rhinovirus/Enterovirus Not Detected     Influenza A PCR Not Detected     Influenza B PCR Not Detected     Parainfluenza Virus 1 Not Detected     Parainfluenza Virus 2 Not Detected     Parainfluenza Virus 3 Not Detected     Parainfluenza Virus 4 Not Detected     RSV, PCR Not Detected     Bordetella pertussis pcr Not Detected     Bordetella parapertussis PCR Not Detected     Chlamydophila pneumoniae PCR Not Detected     Mycoplasma pneumo by PCR Not Detected    Narrative:      Fact sheet for providers: https://docs.[employer]/wp-content/uploads/JIU2700-0501-ZV5.1-EUA-Provider-Fact-Sheet-3.pdf    Fact sheet for patients: https://docs.[employer]/wp-content/uploads/MHU3914-8169-BY6.1-EUA-Patient-Fact-Sheet-1.pdf    Test performed by PCR.          ECG/EMG Results (most recent)     Procedure Component Value  Units Date/Time    ECG 12 Lead [[id number]] Collected: 02/03/21 2233     Updated: 02/03/21 2235     QT Interval 340 ms     Narrative:      HEART RATE= 116  bpm  RR Interval= 516  ms  AL Interval= 123  ms  P Horizontal Axis= -37  deg  P Front Axis= -10  deg  QRSD Interval= 144  ms  QT Interval= 340  ms  QRS Axis= 4  deg  T Wave Axis= 123  deg  - ABNORMAL ECG -  Sinus tachycardia  Left bundle branch block  ST elevation secondary to IVCD  Electronically Signed By:   Date and Time of Study: 2021-02-03 22:33:20              Ct Chest Pulmonary Embolism    Result Date: 2/3/2021  1.  Right lower lobe pneumonia. 2.  Aortic valve calcifications. Correlate for aortic valve stenosis. Further evaluation can be obtained with echocardiogram. 3.  Moderate pulmonary emphysema. 4.  No evidence of pulmonary embolus. Electronically signed by:  Umm Pablo M.D.  2/3/2021 10:31 PM        Estimated Creatinine Clearance: 88.2 mL/min (by C-G formula based on SCr of 0.83 mg/dL).    Assessment/Plan   Assessment/Plan       Active Hospital Problems    Diagnosis  POA   • **Aspiration pneumonia due to vomitus (CMS/[facility]) [J69.0]  Yes     Priority: High   • Anxiety associated with depression [F41.8]  Yes   • COPD (chronic obstructive pulmonary disease) (CMS/[facility]) [J44.9]  Yes   • Essential hypertension [I10]  Yes   • Mixed hyperlipidemia [E78.2]  Yes   • Paroxysmal atrial fibrillation (CMS/[facility]) [I48.0]  Yes   • Chronic systolic congestive heart failure, NYHA class 1 (CMS/[facility]) [I50.22]  Yes      Resolved Hospital Problems   No resolved problems to display.     Pneumonia    --likely aspiration  -CXR reviewed   -EKG reviewed  -WBC 14.30  -Lactate 3.0  -Procalcitonin 0.04  -Blood Cultures ordered, pending  -Sputum culture ordered  -Urine antigen: Strep Pneumo & legionella  -Respiratory virus panel negative  -Continue Vanco and Zosyn  -Guaifenesin PO q 12 hours  -Benzonatate PO TID PRN  -Duoneb PRN  -Continuous Pulse Ox  -Cough & Deep  Breathe  -IS  -N.p.o., nursing swallow study  -Oxygen supplementation, titrate as tolerated to keep oxygen sats >90%    Essential Hypertension, Chronic, Controlled; pacemaker in situ; CAD; HLD; CHF  -Continue home aspirin, atorvastatin, metoprolol  - Monitor with routine vital signs     Anxiety/Depression  -Continue Zoloft    COPD, not in exacerbation  -Continue home inhalers  -Incentive spirometry  -Titrate O2 for sats > 90%    Dietary supplementation  -Hold dietary supplements for now      VTE Prophylaxis -   Mechanical Order History:     SCDs      Pharmalogical Order History:     None          CODE STATUS:    Code Status and Medical Interventions:   Ordered at: 02/04/21 0131     Code Status:    CPR     Medical Interventions (Level of Support Prior to Arrest):    Full       This patient has been examined wearing appropriate Personal Protective Equipment. 02/04/21      I discussed the patient's findings and my recommendations with patient.      Signature:Electronically signed by ANIRUDH Salazar, 02/04/21, 4:35 AM EST.      [facility] Hospitalist Team          Agree with above mentioned except my evaluation, assessment, plan and treatment supercedes that of ARNREID or PA.        Electronically signed by Hector Dillon MD, 02/04/21, 12:57 PM EST.          Mr. Worley is a 71 y.o. male who presents to [facility] ED with a history of atrial fibrillation, hypertension, hyperlipidemia, COPD, and MI complaining of aspiration.  Patient states earlier this evening he was eating dinner with his daughter when all of a sudden he began to cough.  He states he was unable to make the coughing stop and he became short of air.  Patient denies chest pain, fever, hematochezia, and hematemesis there were no known relieving factors noted at the time.  And patient presented to the emergency department.     In the ED, troponin negative, .5 lactate 3.0, D-dimer 2.37, PTT 23.1, WBCs 14.3.  ABG shows pH 7.40, CO2 45.2, O2  60.7, HCO3 28.0, base excess 2.4 on 100% nonrebreather.  Blood cultures pending.  Blood alcohol negative.  EKG shows sinus tachycardia with rate of 116.  Respiratory viral panel with COVID-19 negative.  CT chest PE protocol shows right lower lobe pneumonia, aortic valve calcifications, moderate pulmonary emphysema but no evidence of pulmonary embolus.  Patient admitted for further evaluation and treatment.    Patient confused and unable to provide any history whatsoever      ROS:  Constitution: Negative for chills and fever.   Cardiovascular: Negative for chest pain.   Respiratory: Positive for cough and shortness of breath. Negative for sputum production.    Gastrointestinal: Negative for abdominal pain, hematemesis, hematochezia, melena, nausea and vomiting.   Genitourinary: Negative for dysuria.   All other systems reviewed and are negative.  Noted        Physical Exam   Constitutional: Patient appears well-developed and well-nourished and in no acute distress     HEENT:   Head: Normocephalic and atraumatic.   Eyes:  Pupils are equal, round, and reactive to light. EOM are intact. Sclera are anicteric and non-injected.  Mouth and Throat: Patient has moist mucous membranes. Oropharynx is clear of any erythema or exudate.       Neck: Neck supple.  No thyromegaly present. No lymphadenopathy present. No  masses.     Cardiovascular: Inspection: No JVD present. Palpation: No parasternal heave. Pedal pulses +1 bilaterally. No leg edema. Auscultation: Regular rate, regular rhythm, S1 normal and S2 normal. reveals no gallop and no friction rub. No Carotid bruit bilaterally.    Pulmonary/Chest: Inspection: No distress, no use of accessory muscles. Lungs are clear to auscultation bilaterally. No respiratory distress. No wheezes. No rales.     Abdomen /Gastrointestinal: Inspection: no distension. Palpation: no masses, no organomegaly. Soft. There is no tenderness. Bowel sounds are normal.     Musculoskeletal: Normal Muscle  tone. Age appropriate, no deformities.    Neurological: Patient is confused. Cranial nerves II-XII are grossly intact with no focal deficits. Sensori-motor exam is normal. No cerebellar signs.    Skin: Skin is warm. No rash noted. Nails show no clubbing.  No cyanosis or erythema. No bruising.    Emotional Behavior:   Appropriate   Debilities:  Age appropriate    Active Hospital Problems    Diagnosis  POA   • **Aspiration pneumonia due to vomitus (CMS/[facility]) [J69.0]  Yes     Priority: High   • COPD (chronic obstructive pulmonary disease) (CMS/[facility]) [J44.9]  Yes   • Essential hypertension [I10]  Yes   • Mixed hyperlipidemia [E78.2]  Yes   • Paroxysmal atrial fibrillation (CMS/[facility]) [I48.0]  Yes      Resolved Hospital Problems   No resolved problems to display.       Pneumonia:  Antibiotics-goal 5d course for uncomplicated pneumonia  Routine sputum culture not indicated per IDSA guidelines  Negative MRSA screen, high negative predictive value for MRSA pneumonia  Bronchodilators if needed  Oxygen supplementation if needed to keep sat between 88 to 92%  Expectorants if needed  Antipyretics  Supportive treatment  Follow chest x-ray if needed  Follow labs if appropriate      COPD:  Bronchodilators-beta agonist, muscarinic agents  Steroids, inhaled +/- oral  Antibiotics demonstrated to help if CRP increased, otherwise avoid antibiotics for stewardship.  Mucolytic's if needed  Counseled to quit smoking  Oxygen support, not to exceed oxygen saturation of 92%, higher oxygen saturation has worse outcomes        Hypertension:  Continue home medications   Options include-  beta-blockers  Calcium channel blockers  ACE inhibitor  Vasodilators  Low-dose diuretics  PRN medications have not been shown to affect outcomes-to be avoided        Hyperlipidemia:  Check lipid panel  Statins if indicated  Add Ezetimibe if appropriate  No role for omega-3 demonstrated.      Atrial fibrillation:   Rate control  Beta-blockers  Calcium channel  blockers  Digoxin  +/-Amiodarone  Anticoagulation per CHADS VASC2 SCORE  Close monitoring

## 2021-02-04 NOTE — PROGRESS NOTES
Discharge Planning Assessment   Low     Patient Name: Viet Worley  MRN: [id number]  Today's Date: 2/4/2021    Admit Date: 2/3/2021    Discharge Needs Assessment     Row Name 02/04/21 1504       Living Environment    Lives With  alone    Unique Family Situation  Currently staying with lindsay Gilmore.    Current Living Arrangements  home/apartment/condo    Duration at Residence  Pt lives in [facility].    Primary Care Provided by  self;child(roger)    Provides Primary Care For  no one    Family Caregiver if Needed  child(roger), adult    Able to Return to Prior Arrangements  yes    Living Arrangement Comments  daughter's home.       Resource/Environmental Concerns    Resource/Environmental Concerns  none       Transition Planning    Patient/Family Anticipates Transition to  home with family    Patient/Family Anticipated Services at Transition  none    Transportation Anticipated  family or friend will provide       Discharge Needs Assessment    Readmission Within the Last 30 Days  no previous admission in last 30 days    Concerns to be Addressed  denies needs/concerns at this time    Concerns Comments  Current IV antibiotics    Discharge Coordination/Progress  Dc Plan: Home with Lydia montoya. Current IV antibiotics. PCP is Andre Lovett III [place]        Discharge Plan     Row Name 02/04/21 1510       Plan    Plan  No home O2.    Row Name 02/04/21 1508       Plan    Plan  Dc Plan: Home with Lydia montoya. Current IV antibiotics. PCP is Andre Lovett III [place]        Continued Care and Services - Admitted Since 2/3/2021    Coordination has not been started for this encounter.         Demographic Summary     Row Name 02/04/21 1501       General Information    Admission Type  inpatient    Arrived From  emergency department    Referral Source  admission list    Reason for Consult  discharge planning    Preferred Language  English     Used During This Interaction  no     General Information Comments  Spoke to pt in room.        Functional Status     Row Name 02/04/21 1502       Functional Status    Usual Activity Tolerance  moderate    Current Activity Tolerance  moderate       Functional Status, IADL    Medications  assistive person    Meal Preparation  assistive person    Housekeeping  independent    Laundry  assistive person    Shopping  assistive person    IADL Comments  Pt is staying with daughter, Lydia Gilmore       Mental Status    General Appearance WDL  WDL       Mental Status Summary    Mental Status Comments  Alert/oriented. Appropriate.        Met with patient in room wearing PPE: mask, goggles.      Maintained distance greater than six feet and spent less than 15 minutes in the room.               Ishmael Ryan, RN

## 2021-02-04 NOTE — PROGRESS NOTES
Continued Stay Note  [facility]     Patient Name: Viet Worley  MRN: [id number]  Today's Date: 2/4/2021    Admit Date: 2/3/2021    Discharge Plan     Row Name 02/04/21 1512       Plan    Plan  Dc Plan: Home with daughterLydia. Current IV antibiotics. PCP is Andre Lovett III [place]    Row Name 02/04/21 1510       Plan    Plan  No home O2.    Row Name 02/04/21 1508       Plan    Plan  Dc Plan: Home with Lydia montoya. Current IV antibiotics. PCP is Andre Lovett III [place]        Chart review only.             Ishmael Ryan RN

## 2021-02-04 NOTE — ED PROVIDER NOTES
Subjective   71-year-old male transferred from home for shortness of air.  Patient normally does not have any baseline oxygen requirements and is currently on a nonrebreather.  Patient apparently complained of some chest pain earlier but now denies any pain.  Patient reportedly has a history of dementia is oriented to only person.  Is a very limited historian.          Review of Systems   Unable to perform ROS: Dementia       Past Medical History:   Diagnosis Date   • Atrial fibrillation (CMS/HCC)    • Benign essential HTN    • COPD (chronic obstructive pulmonary disease) (CMS/HCC)    • Heart attack (CMS/HCC)    • Hyperlipidemia, mixed        No Known Allergies    History reviewed. No pertinent surgical history.    History reviewed. No pertinent family history.    Social History     Socioeconomic History   • Marital status:      Spouse name: Not on file   • Number of children: Not on file   • Years of education: Not on file   • Highest education level: Not on file   Tobacco Use   • Smoking status: Former Smoker   Substance and Sexual Activity   • Alcohol use: Not Currently   • Drug use: Not Currently   • Sexual activity: Defer           Objective   Physical Exam  Constitutional:       Appearance: He is well-developed.   HENT:      Head: Normocephalic and atraumatic.      Mouth/Throat:      Mouth: Mucous membranes are moist.      Pharynx: Oropharynx is clear.   Eyes:      Conjunctiva/sclera: Conjunctivae normal.      Pupils: Pupils are equal, round, and reactive to light.   Neck:      Musculoskeletal: Normal range of motion and neck supple.   Cardiovascular:      Rate and Rhythm: Tachycardia present.      Heart sounds: Normal heart sounds.   Pulmonary:      Comments: Mild respiratory distress, mildly decreased air movement bilaterally, mild wheezes bilaterally  Abdominal:      General: Bowel sounds are normal. There is no distension.      Palpations: Abdomen is soft.      Tenderness: There is no abdominal  tenderness.   Musculoskeletal: Normal range of motion.         General: No swelling or tenderness.   Skin:     General: Skin is warm and dry.   Neurological:      Mental Status: He is alert.      Comments: Oriented to person only, nonfocal   Psychiatric:         Mood and Affect: Mood normal.         Behavior: Behavior normal.         Procedures           ED Course  ED Course as of Feb 04 0048 Wed Feb 03, 2021   2248 EKG interpretation: Sinus tachycardia, rate 116, left bundle branch block    [JR]      ED Course User Index  [JR] Alf De La Rosa MD                                           [facility]  Number of Diagnoses or Management Options  Acute respiratory failure with hypoxemia (CMS/HCC):   Pneumonia of right lower lobe due to infectious organism:   Diagnosis management comments: Results for orders placed or performed during the hospital encounter of 02/03/21  -Respiratory Panel PCR w/COVID-19(SARS-CoV-2) JULIO/HERMAN/JOE/PAD/COR/MAD/DEMARCO In-House, NP Swab in UTM/VTM, 3-4 HR TAT - Swab, Nasopharynx  Specimen: Nasopharynx; Swab       Result                      Value             Ref Range           ADENOVIRUS, PCR             Not Detected      Not Detected        Coronavirus 229E            Not Detected      Not Detected        Coronavirus HKU1            Not Detected      Not Detected        Coronavirus NL63            Not Detected      Not Detected        Coronavirus OC43            Not Detected      Not Detected        COVID19                     Not Detected      Not Detected*       Human Metapneumovirus       Not Detected      Not Detected        Human Rhinovirus/Enter*     Not Detected      Not Detected        Influenza A PCR             Not Detected      Not Detected        Influenza B PCR             Not Detected      Not Detected        Parainfluenza Virus 1       Not Detected      Not Detected        Parainfluenza Virus 2       Not Detected      Not Detected        Parainfluenza Virus 3       Not Detected      Not  Detected        Parainfluenza Virus 4       Not Detected      Not Detected        RSV, PCR                    Not Detected      Not Detected        Bordetella pertussis p*     Not Detected      Not Detected        Bordetella parapertuss*     Not Detected      Not Detected        Chlamydophila pneumoni*     Not Detected      Not Detected        Mycoplasma pneumo by P*     Not Detected      Not Detected   -Comprehensive Metabolic Panel  Specimen: Blood       Result                      Value             Ref Range           Glucose                     100 (H)           65 - 99 mg/dL       BUN                         10                8 - 23 mg/dL        Creatinine                  0.93              0.76 - 1.27 *       Sodium                      137               136 - 145 mm*       Potassium                   4.7               3.5 - 5.2 mm*       Chloride                    98                98 - 107 mmo*       CO2                         26.0              22.0 - 29.0 *       Calcium                     9.4               8.6 - 10.5 m*       Total Protein               8.2               6.0 - 8.5 g/*       Albumin                     4.50              3.50 - 5.20 *       ALT (SGPT)                  14                1 - 41 U/L          AST (SGOT)                  22                1 - 40 U/L          Alkaline Phosphatase        95                39 - 117 U/L        Total Bilirubin             0.5               0.0 - 1.2 mg*       eGFR Non  Amer       80                >60 mL/min/1*       Globulin                    3.7               gm/dL               A/G Ratio                   1.2               g/dL                BUN/Creatinine Ratio        10.8              7.0 - 25.0          Anion Gap                   13.0              5.0 - 15.0 m*  -CBC Auto Differential  Specimen: Blood       Result                      Value             Ref Range           WBC                         14.30 (H)         3.40 - 10.80*        RBC                         5.50              4.14 - 5.80 *       Hemoglobin                  15.3              13.0 - 17.7 *       Hematocrit                  46.9              37.5 - 51.0 %       MCV                         85.3              79.0 - 97.0 *       MCH                         27.9              26.6 - 33.0 *       MCHC                        32.7              31.5 - 35.7 *       RDW                         15.0              12.3 - 15.4 %       RDW-SD                      45.1              37.0 - 54.0 *       MPV                         8.7               6.0 - 12.0 fL       Platelets                   229               140 - 450 10*       Neutrophil %                73.5              42.7 - 76.0 %       Lymphocyte %                20.6              19.6 - 45.3 %       Monocyte %                  4.5 (L)           5.0 - 12.0 %        Eosinophil %                0.6               0.3 - 6.2 %         Basophil %                  0.8               0.0 - 1.5 %         Neutrophils, Absolute       10.50 (H)         1.70 - 7.00 *       Lymphocytes, Absolute       2.90              0.70 - 3.10 *       Monocytes, Absolute         0.60              0.10 - 0.90 *       Eosinophils, Absolute       0.10              0.00 - 0.40 *       Basophils, Absolute         0.10              0.00 - 0.20 *       nRBC                        0.0               0.0 - 0.2 /1*  -Protime-INR  Specimen: Blood       Result                      Value             Ref Range           Protime                     10.5              9.6 - 11.7 S*       INR                         0.95              0.93 - 1.10    -aPTT  Specimen: Blood       Result                      Value             Ref Range           PTT                         23.1 (L)          24.0 - 31.0 *  -Lipase  Specimen: Blood       Result                      Value             Ref Range           Lipase                      27                13 - 60 U/L    -Troponin  Specimen:  Blood       Result                      Value             Ref Range           Troponin T                  <0.010            0.000 - 0.03*  -D-dimer, Quantitative  Specimen: Blood       Result                      Value             Ref Range           D-Dimer, Quantitative       2.37 (H)          0.00 - 0.59 *  -BNP  Specimen: Blood       Result                      Value             Ref Range           proBNP                      347.5             0.0 - 900.0 *  -Procalcitonin  Specimen: Blood       Result                      Value             Ref Range           Procalcitonin               0.04              0.00 - 0.25 *  -Ethanol  Specimen: Blood       Result                      Value             Ref Range           Ethanol %                   <0.010            %              -Magnesium  Specimen: Blood       Result                      Value             Ref Range           Magnesium                   2.0               1.6 - 2.4 mg*  -Blood Gas, Arterial -  Specimen: Arterial Blood       Result                      Value             Ref Range           Site                        Right Radial                          Rafael's Test                Positive                              pH, Arterial                7.400             7.350 - 7.45*       pCO2, Arterial              45.2              35.0 - 48.0 *       pO2, Arterial               60.7 (L)          83.0 - 108.0*       HCO3, Arterial              28.0              21.0 - 28.0 *       Base Excess, Arterial       2.4               0.0 - 3.0 mm*       O2 Saturation, Arterial     90.6 (L)          94.0 - 98.0 %       CO2 Content                 29.4 (H)          22 - 29 mmol*       Barometric Pressure fo*                                           Modality                    NRB                                   FIO2                        100               %                   Hemodilution                No                               -POC Lactate  Specimen:  Blood       Result                      Value             Ref Range           Lactate                     3.0 (C)           0.5 - 2.0 mm*  -ECG 12 Lead       Result                      Value             Ref Range           QT Interval                 340               ms             -Light Blue Top       Result                      Value             Ref Range           Extra Tube                                                    hold for add-on  -Green Top (Gel)       Result                      Value             Ref Range           Extra Tube                                                    Hold for add-ons.  -Lavender Top       Result                      Value             Ref Range           Extra Tube                                                    hold for add-on  -Gold Top - SST       Result                      Value             Ref Range           Extra Tube                                                    Hold for add-ons.    CT with right lower lobe pneumonia.  Patient is doing much better from a respiratory standpoint with decreased oxygen requirement.  At this time, oxygen has been reduced from a nonrebreather to a 50% facemask with normal oxygen saturations.  Blood pressure has been low normal but patient is still getting his 30 cc/kg bolus.  Case discussed with daughter, Lydia Krause, [phone].  Patient is a DNR but other interventions such as central line placement if necessary is okay with her.  Daughter aware of patient's condition and plan.       Amount and/or Complexity of Data Reviewed  Clinical lab tests: ordered and reviewed  Tests in the radiology section of CPT®: ordered and reviewed  Tests in the medicine section of CPT®: reviewed and ordered        Final diagnoses:   Pneumonia of right lower lobe due to infectious organism   Acute respiratory failure with hypoxemia (CMS/[facility])            Alf De La Rosa MD  02/04/21 0048

## 2021-02-05 VITALS
TEMPERATURE: 97.8 F | SYSTOLIC BLOOD PRESSURE: 111 MMHG | OXYGEN SATURATION: 100 % | WEIGHT: 170.42 LBS | HEART RATE: 84 BPM | BODY MASS INDEX: 21.87 KG/M2 | RESPIRATION RATE: 12 BRPM | DIASTOLIC BLOOD PRESSURE: 60 MMHG | HEIGHT: 74 IN

## 2021-02-05 LAB
ANION GAP SERPL CALCULATED.3IONS-SCNC: 6 MMOL/L (ref 5–15)
BASOPHILS # BLD AUTO: 0 10*3/MM3 (ref 0–0.2)
BASOPHILS NFR BLD AUTO: 0.3 % (ref 0–1.5)
BUN SERPL-MCNC: 11 MG/DL (ref 8–23)
BUN/CREAT SERPL: 13.3 (ref 7–25)
CALCIUM SPEC-SCNC: 8.8 MG/DL (ref 8.6–10.5)
CHLORIDE SERPL-SCNC: 104 MMOL/L (ref 98–107)
CO2 SERPL-SCNC: 27 MMOL/L (ref 22–29)
CREAT SERPL-MCNC: 0.83 MG/DL (ref 0.76–1.27)
DEPRECATED RDW RBC AUTO: 45.5 FL (ref 37–54)
EOSINOPHIL # BLD AUTO: 0 10*3/MM3 (ref 0–0.4)
EOSINOPHIL NFR BLD AUTO: 0.1 % (ref 0.3–6.2)
ERYTHROCYTE [DISTWIDTH] IN BLOOD BY AUTOMATED COUNT: 15.4 % (ref 12.3–15.4)
GFR SERPL CREATININE-BSD FRML MDRD: 91 ML/MIN/1.73
GLUCOSE SERPL-MCNC: 92 MG/DL (ref 65–99)
HCT VFR BLD AUTO: 34.1 % (ref 37.5–51)
HGB BLD-MCNC: 11.4 G/DL (ref 13–17.7)
LYMPHOCYTES # BLD AUTO: 2 10*3/MM3 (ref 0.7–3.1)
LYMPHOCYTES NFR BLD AUTO: 20.5 % (ref 19.6–45.3)
MAGNESIUM SERPL-MCNC: 1.9 MG/DL (ref 1.6–2.4)
MCH RBC QN AUTO: 28.2 PG (ref 26.6–33)
MCHC RBC AUTO-ENTMCNC: 33.5 G/DL (ref 31.5–35.7)
MCV RBC AUTO: 84.3 FL (ref 79–97)
MONOCYTES # BLD AUTO: 0.5 10*3/MM3 (ref 0.1–0.9)
MONOCYTES NFR BLD AUTO: 4.7 % (ref 5–12)
NEUTROPHILS NFR BLD AUTO: 7.2 10*3/MM3 (ref 1.7–7)
NEUTROPHILS NFR BLD AUTO: 74.4 % (ref 42.7–76)
NRBC BLD AUTO-RTO: 0.1 /100 WBC (ref 0–0.2)
PLATELET # BLD AUTO: 169 10*3/MM3 (ref 140–450)
PMV BLD AUTO: 8.7 FL (ref 6–12)
POTASSIUM SERPL-SCNC: 4 MMOL/L (ref 3.5–5.2)
RBC # BLD AUTO: 4.04 10*6/MM3 (ref 4.14–5.8)
SODIUM SERPL-SCNC: 137 MMOL/L (ref 136–145)
WBC # BLD AUTO: 9.7 10*3/MM3 (ref 3.4–10.8)

## 2021-02-05 PROCEDURE — 83735 ASSAY OF MAGNESIUM: CPT | Performed by: STUDENT IN AN ORGANIZED HEALTH CARE EDUCATION/TRAINING PROGRAM

## 2021-02-05 PROCEDURE — 25010000003 AMPICILLIN-SULBACTAM PER 1.5 G: Performed by: INTERNAL MEDICINE

## 2021-02-05 PROCEDURE — 80048 BASIC METABOLIC PNL TOTAL CA: CPT | Performed by: STUDENT IN AN ORGANIZED HEALTH CARE EDUCATION/TRAINING PROGRAM

## 2021-02-05 PROCEDURE — 94799 UNLISTED PULMONARY SVC/PX: CPT

## 2021-02-05 PROCEDURE — 25010000002 ENOXAPARIN PER 10 MG: Performed by: STUDENT IN AN ORGANIZED HEALTH CARE EDUCATION/TRAINING PROGRAM

## 2021-02-05 PROCEDURE — 85025 COMPLETE CBC W/AUTO DIFF WBC: CPT | Performed by: STUDENT IN AN ORGANIZED HEALTH CARE EDUCATION/TRAINING PROGRAM

## 2021-02-05 PROCEDURE — 99239 HOSP IP/OBS DSCHRG MGMT >30: CPT | Performed by: INTERNAL MEDICINE

## 2021-02-05 RX ORDER — AMOXICILLIN AND CLAVULANATE POTASSIUM 875; 125 MG/1; MG/1
1 TABLET, FILM COATED ORAL 2 TIMES DAILY
Qty: 6 TABLET | Refills: 0 | Status: SHIPPED | OUTPATIENT
Start: 2021-02-05 | End: 2021-02-08

## 2021-02-05 RX ADMIN — SODIUM CHLORIDE 3 G: 900 INJECTION, SOLUTION INTRAVENOUS at 10:16

## 2021-02-05 RX ADMIN — ASPIRIN 81 MG CHEWABLE TABLET 81 MG: 81 TABLET CHEWABLE at 08:37

## 2021-02-05 RX ADMIN — ATORVASTATIN CALCIUM 20 MG: 20 TABLET, FILM COATED ORAL at 08:37

## 2021-02-05 RX ADMIN — SODIUM CHLORIDE 3 G: 900 INJECTION, SOLUTION INTRAVENOUS at 04:16

## 2021-02-05 RX ADMIN — Medication 10 ML: at 08:37

## 2021-02-05 RX ADMIN — IPRATROPIUM BROMIDE 0.5 MG: 0.5 SOLUTION RESPIRATORY (INHALATION) at 11:36

## 2021-02-05 RX ADMIN — SERTRALINE HYDROCHLORIDE 100 MG: 100 TABLET ORAL at 08:37

## 2021-02-05 RX ADMIN — SODIUM CHLORIDE 3 G: 900 INJECTION, SOLUTION INTRAVENOUS at 16:03

## 2021-02-05 RX ADMIN — ENOXAPARIN SODIUM 40 MG: 40 INJECTION SUBCUTANEOUS at 16:03

## 2021-02-05 RX ADMIN — METOPROLOL SUCCINATE 50 MG: 50 TABLET, EXTENDED RELEASE ORAL at 08:37

## 2021-02-05 RX ADMIN — Medication: at 08:40

## 2021-02-05 RX ADMIN — GUAIFENESIN 1200 MG: 600 TABLET, EXTENDED RELEASE ORAL at 08:37

## 2021-02-05 NOTE — PROGRESS NOTES
Exercise Oximetry    Patient Name:Viet Worley   MRN: [id number]   Date: 02/05/21             ROOM AIR BASELINE   SpO2% 96   Heart Rate 103   Blood Pressure      EXERCISE ON ROOM AIR SpO2% EXERCISE ON O2 @ 3 LPM SpO2%   1 MINUTE 94 1 MINUTE    2 MINUTES 90 2 MINUTES    3 MINUTES 86 3 MINUTES    4 MINUTES  4 MINUTES 91   5 MINUTES  5 MINUTES 93   6 MINUTES  6 MINUTES 93              Distance Walked   Distance Walked   Dyspnea (Francisco Scale)   Dyspnea (Francisco Scale)   Fatigue (Francisco Scale)   Fatigue (Francisco Scale)   SpO2% Post Exercise   SpO2% Post Exercise   HR Post Exercise   HR Post Exercise   Time to Recovery   Time to Recovery     Comments: Patient requires 3 liters O2 to maintain sats greater than 92%.  Gloria Emery, CRT

## 2021-02-05 NOTE — PROGRESS NOTES
Continued Stay Note  [facility]     Patient Name: Viet Worley  MRN: [id number]  Today's Date: 2/5/2021    Admit Date: 2/3/2021    Discharge Plan     Row Name 02/05/21 1542       Plan    Plan  Dc Plan: Home with daughter, Lydia Gilmore. Current IV antibiotics. PCP is Andre Lovett III [place], O2 ordered per Zepeda's 3:43 pm.        Chart review only.       Expected Discharge Date and Time     Expected Discharge Date Expected Discharge Time    Feb 5, 2021             Ishmael Ryan RN

## 2021-02-05 NOTE — NURSING NOTE
Tried to call daughter for discharge of patient, daughter did not answer, left a message. Will try to contact again

## 2021-02-05 NOTE — DISCHARGE PLACEMENT REQUEST
"Viet Mosher (71 y.o. Male)     Date of Birth Social Security Number Address Home Phone MRN    1950  [id number]  [facility] [address] [phone] [id number]    Tenriism Marital Status          None        Admission Date Admission Type Admitting Provider Attending Provider Department, Room/Bed    2/3/21 Emergency Hector Dillon MD Gill, Ravi, MD [facility],     Discharge Date Discharge Disposition Discharge Destination         Home or Self Care              Attending Provider: Hector Dillon MD    Allergies: No Known Allergies    Isolation: None   Infection: None   Code Status: CPR    Ht: 188 cm (74\")   Wt: 77.3 kg (170 lb 6.7 oz)    Admission Cmt: None   Principal Problem: Aspiration pneumonia due to vomitus (CMS/[facility]) [J69.0]                 Active Insurance as of 2/3/2021     Primary Coverage     Payor Plan Insurance Group Employer/Plan Group    MEDICARE MEDICARE A & B      Payor Plan Address Payor Plan Phone Number Payor Plan Fax Number Effective Dates    [address] [phone]  2014 - None Entered    [facility] [id number]       Subscriber Name Subscriber Birth Date Member ID       VIET MOSHER 1950 [id number]           Secondary Coverage     Payor Plan Insurance Group Employer/Plan Group    [employer]HE[employer] BLUE CROSS AN[employer]M BLUE CROSS BLUE [employer]IELD PPO [id number]     Payor Plan Address Payor Plan Phone Number Payor Plan Fax Number Effective Dates    [address] [phone]  2018 - None Entered    [facility] [id number]       Subscriber Name Subscriber Birth Date Member ID       VIET MOSHER 1950 [id number]                 Emergency Contacts      (Rel.) Home Phone Work Phone Mobile Phone    Jenna Krause (Daughter) -- -- [phone]               History & Physical      Hector Dillon MD at 21 0057                [facility] Medicine Services      Patient Name: Viet Mosher  : 1950  MRN: [id number]  Primary " Care Physician: Provider, No Known  Date of admission: 2/3/2021    Patient Care Team:  Provider, No Known as PCP - General          Subjective   History Present Illness     Chief Complaint:   Chief Complaint   Patient presents with   • Chest Pain       Mr. Worley is a 71 y.o. male who presents to [facility] ED with a history of atrial fibrillation, hypertension, hyperlipidemia, COPD, and MI complaining of aspiration.  Patient states earlier this evening he was eating dinner with his daughter when all of a sudden he began to cough.  He states he was unable to make the coughing stop and he became short of air.  Patient denies chest pain, fever, hematochezia, and hematemesis there were no known relieving factors noted at the time.  And patient presented to the emergency department.    In the ED, troponin negative, .5 lactate 3.0, D-dimer 2.37, PTT 23.1, WBCs 14.3.  ABG shows pH 7.40, CO2 45.2, O2 60.7, HCO3 28.0, base excess 2.4 on 100% nonrebreather.  Blood cultures pending.  Blood alcohol negative.  EKG shows sinus tachycardia with rate of 116.  Respiratory viral panel with COVID-19 negative.  CT chest PE protocol shows right lower lobe pneumonia, aortic valve calcifications, moderate pulmonary emphysema but no evidence of pulmonary embolus.  Patient admitted for further evaluation and treatment.        Review of Systems   Constitution: Negative for chills and fever.   Cardiovascular: Negative for chest pain.   Respiratory: Positive for cough and shortness of breath. Negative for sputum production.    Musculoskeletal: Myalgias:      Gastrointestinal: Negative for abdominal pain, hematemesis, hematochezia, melena, nausea and vomiting.   Genitourinary: Negative for dysuria.   Neurological: Excessive daytime sleepiness:      All other systems reviewed and are negative.          Personal History     Past Medical History:   Past Medical History:   Diagnosis Date   • Atrial fibrillation (CMS/HCC)    •  Benign essential HTN    • COPD (chronic obstructive pulmonary disease) (CMS/HCC)    • COVID-19 virus detected 2/4/2021   • Heart attack (CMS/[facility])    • Hyperlipidemia, mixed        Surgical History:    History reviewed. No pertinent surgical history.      Family History: family history includes No Known Problems in his father and mother. Otherwise pertinent FHx was reviewed and unremarkable.     Social History:  reports that he quit smoking about 19 months ago. He has quit using smokeless tobacco. He reports previous alcohol use. He reports previous drug use. Drug: Marijuana.      Medications:  Prior to Admission medications    Medication Sig Start Date End Date Taking? Authorizing Provider   aspirin 81 MG chewable tablet Chew 81 mg Daily.   Yes Donna Palmer MD   atorvastatin (LIPITOR) 20 MG tablet 1 tablet Daily. 1/14/20  Yes Donna Palmer MD   magnesium oxide (MAG-OX) 400 MG tablet Take 1 tablet by mouth Daily. 1/15/20  Yes Donna Palmer MD   metoprolol succinate XL (TOPROL-XL) 50 MG 24 hr tablet Take 1 tablet by mouth Daily. 1/14/20  Yes Donna Palmer MD   sertraline (ZOLOFT) 100 MG tablet 1 tablet Daily. 1/20/20  Yes ProviderDonna MD   thiamine (VITAMIN B-1) 100 MG tablet Take 100 mg by mouth Daily.   Yes ProviderDonna MD   TRELERONNA ELLIPTA 100-62.5-25 MCG/INH aerosol powder  1 puff Daily. 1/16/20  Yes ProviderDonna MD   VENTOLIN  (90 Base) MCG/ACT inhaler Take As Directed. 12/17/19  Yes Donna Palmer MD   midodrine (PROAMATINE) 5 MG tablet Take 1 tablet by mouth 2 (Two) Times a Day. 1/29/20   Mikey Balderrama MD       Allergies:  No Known Allergies    Objective   Objective     Vital Signs  Temp:  [98.1 °F (36.7 °C)-99 °F (37.2 °C)] 98.1 °F (36.7 °C)  Heart Rate:  [100-131] 100  Resp:  [21-24] 21  BP: ()/() 105/46  SpO2:  [85 %-99 %] 96 %  on  Flow (L/min):  [5-15] 5;   Device (Oxygen Therapy): nasal cannula  Body mass index is 21.94  kg/m².    Physical Exam  Vitals signs reviewed.   Constitutional:       Appearance: Normal appearance.   HENT:      Head: Normocephalic and atraumatic.      Nose: Nose normal.      Mouth/Throat:      Mouth: Mucous membranes are moist.      Pharynx: Oropharynx is clear.   Eyes:      Conjunctiva/sclera: Conjunctivae normal.      Pupils: Pupils are equal, round, and reactive to light.   Neck:      Musculoskeletal: Normal range of motion.   Cardiovascular:      Rate and Rhythm: Normal rate and regular rhythm.      Pulses: Normal pulses.      Heart sounds: Normal heart sounds.   Pulmonary:      Effort: Pulmonary effort is normal.      Comments: Left lung with coarse rhonchi throughout; right lung clear  Abdominal:      General: Bowel sounds are normal.      Palpations: Abdomen is soft.   Musculoskeletal: Normal range of motion.   Skin:     General: Skin is warm and dry.      Capillary Refill: Capillary refill takes less than 2 seconds.   Neurological:      General: No focal deficit present.      Mental Status: He is alert and oriented to person, place, and time.   Psychiatric:         Mood and Affect: Mood normal.         Behavior: Behavior normal.         Thought Content: Thought content normal.         Judgment: Judgment normal.         Results Review:  I have personally reviewed most recent cardiac tracings, lab results, microbiology results and radiology images and interpretations and agree with findings, most notably: Radiology results.    Results from last 7 days   Lab Units 02/04/21  0226 02/03/21  2241   WBC 10*3/mm3 12.10* 14.30*   HEMOGLOBIN g/dL 13.0 15.3   HEMATOCRIT % 39.5 46.9   PLATELETS 10*3/mm3 180 229   INR   --  0.95     Results from last 7 days   Lab Units 02/04/21  0226  02/03/21  2241   SODIUM mmol/L 137  --  137   POTASSIUM mmol/L 4.1  --  4.7   CHLORIDE mmol/L 105  --  98   CO2 mmol/L 23.0  --  26.0   BUN mg/dL 9  --  10   CREATININE mg/dL 0.83  --  0.93   GLUCOSE mg/dL 117*  --  100*   CALCIUM  mg/dL 8.0*  --  9.4   ALT (SGPT) U/L  --   --  14   AST (SGOT) U/L  --   --  22   TROPONIN T ng/mL  --   --  <0.010   PROBNP pg/mL  --   --  347.5   LACTATE mmol/L 2.6*   < >  --    PROCALCITONIN ng/mL  --   --  0.04    < > = values in this interval not displayed.     Estimated Creatinine Clearance: 88.2 mL/min (by C-G formula based on SCr of 0.83 mg/dL).  Brief Urine Lab Results     None          Microbiology Results (last 10 days)     Procedure Component Value - Date/Time    Respiratory Panel PCR w/COVID-19(SARS-CoV-2) JULIO/HERMAN/JOE/PAD/COR/MAD/DEMARCO In-House, NP Swab in UTM/VTM, 3-4 HR TAT - Swab, Nasopharynx [[id number]]  (Normal) Collected: 02/03/21 2870    Lab Status: Final result Specimen: Swab from Nasopharynx Updated: 02/04/21 0011     ADENOVIRUS, PCR Not Detected     Coronavirus 229E Not Detected     Coronavirus HKU1 Not Detected     Coronavirus NL63 Not Detected     Coronavirus OC43 Not Detected     COVID19 Not Detected     Human Metapneumovirus Not Detected     Human Rhinovirus/Enterovirus Not Detected     Influenza A PCR Not Detected     Influenza B PCR Not Detected     Parainfluenza Virus 1 Not Detected     Parainfluenza Virus 2 Not Detected     Parainfluenza Virus 3 Not Detected     Parainfluenza Virus 4 Not Detected     RSV, PCR Not Detected     Bordetella pertussis pcr Not Detected     Bordetella parapertussis PCR Not Detected     Chlamydophila pneumoniae PCR Not Detected     Mycoplasma pneumo by PCR Not Detected    Narrative:      Fact sheet for providers: https://docs.[employer]/wp-content/uploads/XLF8986-6353-UN7.1-EUA-Provider-Fact-Sheet-3.pdf    Fact sheet for patients: https://docs.[employer]/wp-content/uploads/IXD7386-7047-MY5.1-EUA-Patient-Fact-Sheet-1.pdf    Test performed by PCR.          ECG/EMG Results (most recent)     Procedure Component Value Units Date/Time    ECG 12 Lead [[id number]] Collected: 02/03/21 2233     Updated: 02/03/21 2235     QT Interval 340 ms     Narrative:      HEART  RATE= 116  bpm  RR Interval= 516  ms  CA Interval= 123  ms  P Horizontal Axis= -37  deg  P Front Axis= -10  deg  QRSD Interval= 144  ms  QT Interval= 340  ms  QRS Axis= 4  deg  T Wave Axis= 123  deg  - ABNORMAL ECG -  Sinus tachycardia  Left bundle branch block  ST elevation secondary to IVCD  Electronically Signed By:   Date and Time of Study: 2021-02-03 22:33:20              Ct Chest Pulmonary Embolism    Result Date: 2/3/2021  1.  Right lower lobe pneumonia. 2.  Aortic valve calcifications. Correlate for aortic valve stenosis. Further evaluation can be obtained with echocardiogram. 3.  Moderate pulmonary emphysema. 4.  No evidence of pulmonary embolus. Electronically signed by:  Umm Pablo M.D.  2/3/2021 10:31 PM        Estimated Creatinine Clearance: 88.2 mL/min (by C-G formula based on SCr of 0.83 mg/dL).    Assessment/Plan   Assessment/Plan       Active Hospital Problems    Diagnosis  POA   • **Aspiration pneumonia due to vomitus (CMS/[facility]) [J69.0]  Yes     Priority: High   • Anxiety associated with depression [F41.8]  Yes   • COPD (chronic obstructive pulmonary disease) (CMS/[facility]) [J44.9]  Yes   • Essential hypertension [I10]  Yes   • Mixed hyperlipidemia [E78.2]  Yes   • Paroxysmal atrial fibrillation (CMS/[facility]) [I48.0]  Yes   • Chronic systolic congestive heart failure, NYHA class 1 (CMS/[facility]) [I50.22]  Yes      Resolved Hospital Problems   No resolved problems to display.     Pneumonia    --likely aspiration  -CXR reviewed   -EKG reviewed  -WBC 14.30  -Lactate 3.0  -Procalcitonin 0.04  -Blood Cultures ordered, pending  -Sputum culture ordered  -Urine antigen: Strep Pneumo & legionella  -Respiratory virus panel negative  -Continue Vanco and Zosyn  -Guaifenesin PO q 12 hours  -Benzonatate PO TID PRN  -Duoneb PRN  -Continuous Pulse Ox  -Cough & Deep Breathe  -IS  -N.p.o., nursing swallow study  -Oxygen supplementation, titrate as tolerated to keep oxygen sats >90%    Essential Hypertension, Chronic,  Controlled; pacemaker in situ; CAD; HLD; CHF  -Continue home aspirin, atorvastatin, metoprolol  - Monitor with routine vital signs     Anxiety/Depression  -Continue Zoloft    COPD, not in exacerbation  -Continue home inhalers  -Incentive spirometry  -Titrate O2 for sats > 90%    Dietary supplementation  -Hold dietary supplements for now      VTE Prophylaxis -   Mechanical Order History:     SCDs      Pharmalogical Order History:     None          CODE STATUS:    Code Status and Medical Interventions:   Ordered at: 02/04/21 0131     Code Status:    CPR     Medical Interventions (Level of Support Prior to Arrest):    Full       This patient has been examined wearing appropriate Personal Protective Equipment. 02/04/21      I discussed the patient's findings and my recommendations with patient.      Signature:Electronically signed by ANIRUDH Salazar, 02/04/21, 4:35 AM EST.      [facility] Hospitalist Team          Agree with above mentioned except my evaluation, assessment, plan and treatment supercedes that of LETICIA or PA.        Electronically signed by Hector Dillon MD, 02/04/21, 12:57 PM EST.          Mr. Worley is a 71 y.o. male who presents to [facility] ED with a history of atrial fibrillation, hypertension, hyperlipidemia, COPD, and MI complaining of aspiration.  Patient states earlier this evening he was eating dinner with his daughter when all of a sudden he began to cough.  He states he was unable to make the coughing stop and he became short of air.  Patient denies chest pain, fever, hematochezia, and hematemesis there were no known relieving factors noted at the time.  And patient presented to the emergency department.     In the ED, troponin negative, .5 lactate 3.0, D-dimer 2.37, PTT 23.1, WBCs 14.3.  ABG shows pH 7.40, CO2 45.2, O2 60.7, HCO3 28.0, base excess 2.4 on 100% nonrebreather.  Blood cultures pending.  Blood alcohol negative.  EKG shows sinus tachycardia with rate of 116.   Respiratory viral panel with COVID-19 negative.  CT chest PE protocol shows right lower lobe pneumonia, aortic valve calcifications, moderate pulmonary emphysema but no evidence of pulmonary embolus.  Patient admitted for further evaluation and treatment.    Patient confused and unable to provide any history whatsoever      ROS:  Constitution: Negative for chills and fever.   Cardiovascular: Negative for chest pain.   Respiratory: Positive for cough and shortness of breath. Negative for sputum production.    Gastrointestinal: Negative for abdominal pain, hematemesis, hematochezia, melena, nausea and vomiting.   Genitourinary: Negative for dysuria.   All other systems reviewed and are negative.  Noted        Physical Exam   Constitutional: Patient appears well-developed and well-nourished and in no acute distress     HEENT:   Head: Normocephalic and atraumatic.   Eyes:  Pupils are equal, round, and reactive to light. EOM are intact. Sclera are anicteric and non-injected.  Mouth and Throat: Patient has moist mucous membranes. Oropharynx is clear of any erythema or exudate.       Neck: Neck supple.  No thyromegaly present. No lymphadenopathy present. No  masses.     Cardiovascular: Inspection: No JVD present. Palpation: No parasternal heave. Pedal pulses +1 bilaterally. No leg edema. Auscultation: Regular rate, regular rhythm, S1 normal and S2 normal. reveals no gallop and no friction rub. No Carotid bruit bilaterally.    Pulmonary/Chest: Inspection: No distress, no use of accessory muscles. Lungs are clear to auscultation bilaterally. No respiratory distress. No wheezes. No rales.     Abdomen /Gastrointestinal: Inspection: no distension. Palpation: no masses, no organomegaly. Soft. There is no tenderness. Bowel sounds are normal.     Musculoskeletal: Normal Muscle tone. Age appropriate, no deformities.    Neurological: Patient is confused. Cranial nerves II-XII are grossly intact with no focal deficits. Sensori-motor  exam is normal. No cerebellar signs.    Skin: Skin is warm. No rash noted. Nails show no clubbing.  No cyanosis or erythema. No bruising.    Emotional Behavior:   Appropriate   Debilities:  Age appropriate    Active Hospital Problems    Diagnosis  POA   • **Aspiration pneumonia due to vomitus (CMS/[facility]) [J69.0]  Yes     Priority: High   • COPD (chronic obstructive pulmonary disease) (CMS/[facility]) [J44.9]  Yes   • Essential hypertension [I10]  Yes   • Mixed hyperlipidemia [E78.2]  Yes   • Paroxysmal atrial fibrillation (CMS/[facility]) [I48.0]  Yes      Resolved Hospital Problems   No resolved problems to display.       Pneumonia:  Antibiotics-goal 5d course for uncomplicated pneumonia  Routine sputum culture not indicated per IDSA guidelines  Negative MRSA screen, high negative predictive value for MRSA pneumonia  Bronchodilators if needed  Oxygen supplementation if needed to keep sat between 88 to 92%  Expectorants if needed  Antipyretics  Supportive treatment  Follow chest x-ray if needed  Follow labs if appropriate      COPD:  Bronchodilators-beta agonist, muscarinic agents  Steroids, inhaled +/- oral  Antibiotics demonstrated to help if CRP increased, otherwise avoid antibiotics for stewardship.  Mucolytic's if needed  Counseled to quit smoking  Oxygen support, not to exceed oxygen saturation of 92%, higher oxygen saturation has worse outcomes        Hypertension:  Continue home medications   Options include-  beta-blockers  Calcium channel blockers  ACE inhibitor  Vasodilators  Low-dose diuretics  PRN medications have not been shown to affect outcomes-to be avoided        Hyperlipidemia:  Check lipid panel  Statins if indicated  Add Ezetimibe if appropriate  No role for omega-3 demonstrated.      Atrial fibrillation:   Rate control  Beta-blockers  Calcium channel blockers  Digoxin  +/-Amiodarone  Anticoagulation per CHADS VASC2 SCORE  Close monitoring            Electronically signed by Hector Dillon MD at 02/04/21 5972

## 2021-02-05 NOTE — DISCHARGE SUMMARY
Date of Admission: 2/3/2021  209/1    Date of Discharge:  2/5/2021    Length of stay:  LOS: 1 day     Patient was examined with relevant and adequate PPE keeping in mind the current coronavirus pandemic. Minimum of 10 minutes to don and doff PPE.      Presenting Problem/History of Present Illness   Present on Admission:  • Aspiration pneumonia due to vomitus (CMS/HCC)  • Essential hypertension  • Mixed hyperlipidemia  • Paroxysmal atrial fibrillation (CMS/HCC)  • COPD (chronic obstructive pulmonary disease) (CMS/HCC)        Hospital Course  Chief complaint:  Chief Complaint   Patient presents with   • Chest Pain       Viet Worley 71 y.o. male.    PCP  Provider, No Known (General)    Mr. Worley is a 71 y.o. male who presents to [facility] ED with a history of atrial fibrillation, hypertension, hyperlipidemia, COPD, and MI complaining of aspiration.  Patient states earlier this evening he was eating dinner with his daughter when all of a sudden he began to cough.  He states he was unable to make the coughing stop and he became short of air.  Patient denies chest pain, fever, hematochezia, and hematemesis there were no known relieving factors noted at the time.  And patient presented to the emergency department.     In the ED, troponin negative, .5 lactate 3.0, D-dimer 2.37, PTT 23.1, WBCs 14.3.  ABG shows pH 7.40, CO2 45.2, O2 60.7, HCO3 28.0, base excess 2.4 on 100% nonrebreather.  Blood cultures pending.  Blood alcohol negative.  EKG shows sinus tachycardia with rate of 116.  Respiratory viral panel with COVID-19 negative.  CT chest PE protocol shows right lower lobe pneumonia, aortic valve calcifications, moderate pulmonary emphysema but no evidence of pulmonary embolus.  Patient admitted for further evaluation and treatment.    Patient confused and unable to provide any history whatsoever      Patient stable oxygenating 98% on 2 L.  No complaints    ROS  Constitution: Negative for chills and  fever.   Cardiovascular: Negative for chest pain.   Respiratory: Positive for cough and shortness of breath. Negative for sputum production.    Gastrointestinal: Negative for abdominal pain, hematemesis, hematochezia, melena, nausea and vomiting.   Genitourinary: Negative for dysuria.   All other systems reviewed and are negative.  Noted      Family History   Problem Relation Age of Onset   • No Known Problems Mother    • No Known Problems Father         Past Medical History:   Diagnosis Date   • Anxiety associated with depression 2021   • Atrial fibrillation (CMS/HCC)    • Benign essential HTN    • COPD (chronic obstructive pulmonary disease) (CMS/HCC)    • COVID-19 virus detected 2021   • Heart attack (CMS/[facility])    • Hyperlipidemia, mixed        History reviewed. No pertinent surgical history.    Social History     Socioeconomic History   • Marital status:      Spouse name: Not on file   • Number of children: Not on file   • Years of education: Not on file   • Highest education level: Not on file   Tobacco Use   • Smoking status: Former Smoker     Quit date: 2019     Years since quittin.5   • Smokeless tobacco: Former User   Substance and Sexual Activity   • Alcohol use: Not Currently   • Drug use: Not Currently     Types: Marijuana   • Sexual activity: Defer       Vital Signs  Temp:  [97.5 °F (36.4 °C)-98.5 °F (36.9 °C)] 97.8 °F (36.6 °C)  Heart Rate:  [76-92] 84  Resp:  [12-20] 12  BP: ()/(41-65) 111/60  Weight change: 0.642 kg (1 lb 6.7 oz)    Physical Exam:  Physical Exam  Constitutional: Patient appears well-developed and well-nourished and in no acute distress      HEENT:   Head: Normocephalic and atraumatic.   Eyes:  Pupils are equal, round, and reactive to light. EOM are intact. Sclera are anicteric and non-injected.  Mouth and Throat: Patient has moist mucous membranes. Oropharynx is clear of any erythema or exudate.        Neck: Neck supple.  No thyromegaly present. No  lymphadenopathy present. No  masses.      Cardiovascular: Inspection: No JVD present. Palpation: No parasternal heave. Pedal pulses +1 bilaterally. No leg edema. Auscultation: Regular rate, regular rhythm, S1 normal and S2 normal. reveals no gallop and no friction rub. No Carotid bruit bilaterally.     Pulmonary/Chest: Inspection: No distress, no use of accessory muscles. Lungs are clear to auscultation bilaterally. No respiratory distress. No wheezes. No rales.      Abdomen /Gastrointestinal: Inspection: no distension. Palpation: no masses, no organomegaly. Soft. There is no tenderness. Bowel sounds are normal.      Musculoskeletal: Normal Muscle tone. Age appropriate, no deformities.     Neurological: Patient is confused. Cranial nerves II-XII are grossly intact with no focal deficits. Sensori-motor exam is normal. No cerebellar signs.     Skin: Skin is warm. No rash noted. Nails show no clubbing.  No cyanosis or erythema. No bruising.     Emotional Behavior:   Appropriate       Debilities:  Age appropriate  Reviewed, no change in above data from the prior day.         Wounds (last 24 hours)      LDA Wound     Row Name 02/05/21 0750 02/04/21 1905          Wound 02/04/21 0323 Left coccyx Pressure Injury    Wound - Properties Group Placement Date: 02/04/21  - Placement Time: 0323 -SH Present on Hospital Admission: Y  -SH Side: Left  -SH Location: coccyx  -SH Primary Wound Type: Pressure inj  -SH Stage, Pressure Injury : Stage 2  -SH    Dressing Appearance  no drainage;open to air  -KF  no drainage;open to air  -SH     Closure  Open to air;None  -KF  Open to air;None  -SH     Base  blanchable;red  -KF  blanchable;red  -SH     Periwound  --  intact;blanchable;redness  -SH     Periwound Temperature  --  warm  -SH     Periwound Skin Turgor  --  soft  -SH     Drainage Amount  --  none  -SH     Care, Wound  cleansed with;sterile water;barrier applied  -KF  barrier applied  -SH     Periwound Care  --  barrier ointment  applied  -     Retired Wound - Properties Group Date first assessed: 02/04/21  - Time first assessed: 0323  - Present on Hospital Admission: Y  - Side: Left  -SH Location: coccyx  -SH Primary Wound Type: Pressure inj  -      User Key  (r) = Recorded By, (t) = Taken By, (c) = Cosigned By    Initials Name Provider Type     Latasha Cisneros RN Registered Nurse    Aria Cui LPN Licensed Nurse          Discharge Diagnosis:     Active Hospital Problems    Diagnosis  POA   • **Aspiration pneumonia due to vomitus (CMS/[facility]) [J69.0]  Yes     Priority: High   • COPD (chronic obstructive pulmonary disease) (CMS/[facility]) [J44.9]  Yes   • Essential hypertension [I10]  Yes   • Mixed hyperlipidemia [E78.2]  Yes   • Paroxysmal atrial fibrillation (CMS/[facility]) [I48.0]  Yes      Resolved Hospital Problems   No resolved problems to display.       Estimated Creatinine Clearance: 88 mL/min (by C-G formula based on SCr of 0.83 mg/dL).    Discharge Disposition    Continued Care and Services - Admitted Since 2/3/2021    Coordination has not been started for this encounter.             PT Recommendation and Plan          Home or Self Care           Discharge Medications      New Medications      Instructions Start Date   amoxicillin-clavulanate 875-125 MG per tablet  Commonly known as: Augmentin   1 tablet, Oral, 2 Times Daily         Continue These Medications      Instructions Start Date   aspirin 81 MG chewable tablet   81 mg, Oral, Daily      atorvastatin 20 MG tablet  Commonly known as: LIPITOR   1 tablet, Daily      magnesium oxide 400 MG tablet  Commonly known as: MAG-OX   1 tablet, Oral, Daily      metoprolol succinate XL 50 MG 24 hr tablet  Commonly known as: TOPROL-XL   1 tablet, Oral, Daily      sertraline 100 MG tablet  Commonly known as: ZOLOFT   1 tablet, Daily      thiamine 100 MG tablet  Commonly known as: VITAMIN B-1   100 mg, Oral, Daily      Trelegy Ellipta 100-62.5-25 MCG/INH aerosol powder   Generic drug:  Fluticasone-Umeclidin-Vilant   1 puff, Daily      Ventolin  (90 Base) MCG/ACT inhaler  Generic drug: albuterol sulfate HFA   Take As Directed           Discharge medications personally reviewed by me and med rec done by me personally.  02/05/21, 3:35 PM EST        Consults:   Consults     Date and Time Order Name Status Description    2/4/2021 0045 Inpatient Hospitalist Consult Completed           Procedures Performed:    * No surgery found *        Pertinent Test Results:   Results from last 7 days   Lab Units 02/05/21 0622 02/04/21 0226 02/03/21  2241   WBC 10*3/mm3 9.70 12.10* 14.30*   HEMOGLOBIN g/dL 11.4* 13.0 15.3   HEMATOCRIT % 34.1* 39.5 46.9   MCV fL 84.3 84.1 85.3   MCH pg 28.2 27.8 27.9   PLATELETS 10*3/mm3 169 180 229     Results from last 7 days   Lab Units 02/05/21 0622 02/04/21 0226 02/03/21  2241   SODIUM mmol/L 137 137 137   POTASSIUM mmol/L 4.0 4.1 4.7   CHLORIDE mmol/L 104 105 98   CO2 mmol/L 27.0 23.0 26.0   BUN mg/dL 11 9 10   CREATININE mg/dL 0.83 0.83 0.93   CALCIUM mg/dL 8.8 8.0* 9.4   MAGNESIUM mg/dL 1.9  --  2.0   BILIRUBIN mg/dL  --   --  0.5   ALK PHOS U/L  --   --  95   ALT (SGPT) U/L  --   --  14   AST (SGOT) U/L  --   --  22   GLUCOSE mg/dL 92 117* 100*     Lab Results   Component Value Date    CALCIUM 8.8 02/05/2021     No results found for: HGBA1C  No results found for: CHOL, CHLPL, TRIG, HDL, LDL, LDLDIRECT  Lab Results   Component Value Date    LIPASE 27 02/03/2021     Results from last 7 days   Lab Units 02/03/21  2300   PH, ARTERIAL pH units 7.400   PO2 ART mm Hg 60.7*   PCO2, ARTERIAL mm Hg 45.2   HCO3 ART mmol/L 28.0     Pathology  No results found for: INTRAOP, PREDX, FINALDX, COMDX  Inflammatory Biomarkers        Invalid input(s): ESR, D-DIMER QUANTITATIVE,  PROCALCITONIN  COVID19   Date Value Ref Range Status   02/03/2021 Not Detected Not Detected - Ref. Range Final        Microbiology Results (last 10 days)     Procedure Component Value - Date/Time    Legionella  Antigen, Urine - Urine, Urine, Clean Catch [[id number]]  (Normal) Collected: 02/04/21 1055    Lab Status: Final result Specimen: Urine, Clean Catch Updated: 02/04/21 1125     LEGIONELLA ANTIGEN, URINE Negative    S. Pneumo Ag Urine or CSF - Urine, Urine, Clean Catch [[id number]]  (Normal) Collected: 02/04/21 1055    Lab Status: Final result Specimen: Urine, Clean Catch Updated: 02/04/21 1126     Strep Pneumo Ag Negative    MRSA Screen, PCR (Inpatient) - Swab, Nares [[id number]]  (Normal) Collected: 02/04/21 0348    Lab Status: Final result Specimen: Swab from Nares Updated: 02/04/21 0520     MRSA PCR No MRSA Detected    Respiratory Panel PCR w/COVID-19(SARS-CoV-2) [facility]/HERMAN/JOE/PAD/COR/MAD/DEMARCO In-House, NP Swab in UTM/VTM, 3-4 HR TAT - Swab, Nasopharynx [[id number]]  (Normal) Collected: 02/03/21 2305    Lab Status: Final result Specimen: Swab from Nasopharynx Updated: 02/04/21 0011     ADENOVIRUS, PCR Not Detected     Coronavirus 229E Not Detected     Coronavirus HKU1 Not Detected     Coronavirus NL63 Not Detected     Coronavirus OC43 Not Detected     COVID19 Not Detected     Human Metapneumovirus Not Detected     Human Rhinovirus/Enterovirus Not Detected     Influenza A PCR Not Detected     Influenza B PCR Not Detected     Parainfluenza Virus 1 Not Detected     Parainfluenza Virus 2 Not Detected     Parainfluenza Virus 3 Not Detected     Parainfluenza Virus 4 Not Detected     RSV, PCR Not Detected     Bordetella pertussis pcr Not Detected     Bordetella parapertussis PCR Not Detected     Chlamydophila pneumoniae PCR Not Detected     Mycoplasma pneumo by PCR Not Detected    Narrative:      Fact sheet for providers: https://docs.[employer]/wp-content/uploads/HCL7876-6227-XR5.1-EUA-Provider-Fact-Sheet-3.pdf    Fact sheet for patients: https://docs.[employer]/wp-content/uploads/JQI4254-6307-KE3.1-EUA-Patient-Fact-Sheet-1.pdf    Test performed by PCR.    Blood Culture - Blood, Arm, Right [[id number]] Collected: 02/03/21  2242    Lab Status: Preliminary result Specimen: Blood from Arm, Right Updated: 02/04/21 2300     Blood Culture No growth at 24 hours    Blood Culture - Blood, Arm, Left [[id number]] Collected: 02/03/21 2241    Lab Status: Preliminary result Specimen: Blood from Arm, Left Updated: 02/04/21 2300     Blood Culture No growth at 24 hours          ECG/EMG Results (most recent)     Procedure Component Value Units Date/Time    ECG 12 Lead [[id number]] Collected: 02/03/21 2233     Updated: 02/04/21 1352     QT Interval 340 ms     Narrative:      HEART RATE= 116  bpm  RR Interval= 516  ms  MO Interval= 123  ms  P Horizontal Axis= -37  deg  P Front Axis= -10  deg  QRSD Interval= 144  ms  QT Interval= 340  ms  QRS Axis= 4  deg  T Wave Axis= 123  deg  - ABNORMAL ECG -  Sinus tachycardia  Left bundle branch block  ST elevation secondary to IVCD  No previous ECG available for comparison  Electronically Signed By: Alf De La Rosa (Dionisio) 04-Feb-2021 13:51:28  Date and Time of Study: 2021-02-03 22:33:20                    Xr Chest 1 View    Result Date: 2/4/2021  Patchy left basilar airspace disease concerning for pneumonia.  Electronically Signed By-Luiz Monique MD On:2/4/2021 7:36 AM This report was finalized on [id number] by  Luiz Monique MD.    Ct Chest Pulmonary Embolism    Result Date: 2/3/2021  1.  Right lower lobe pneumonia. 2.  Aortic valve calcifications. Correlate for aortic valve stenosis. Further evaluation can be obtained with echocardiogram. 3.  Moderate pulmonary emphysema. 4.  No evidence of pulmonary embolus. Electronically signed by:  Umm Pablo M.D.  2/3/2021 10:31 PM      Xrays, labs reviewed personally by me.  02/05/21  3:35 PM EST      Condition on Discharge:    Stable    Discharge Diet:   Dietary Orders (From admission, onward)     Start     Ordered    02/04/21 0925  Diet Cardiac; Healthy Heart  Diet Effective Now     Question Answer Comment   Diet / Texture / Consistency Cardiac    Select Type: Healthy  Heart        02/04/21 0925                Activity at Discharge:   Activity Instructions     Activity as Tolerated            Follow-up Appointments    No future appointments.    Additional Instructions for the Follow-ups that You Need to Schedule     Discharge Follow-up with PCP   As directed       Currently Documented PCP:    Provider, No Known    PCP Phone Number:    None     Follow Up Details: If no PCP, call MD finder at [phone]           Follow-up Information     Provider, No Known .    Why: If no PCP, call MD finder at [phone]  Contact information:  [facility] [place] [address]                    Test Results Pending at Discharge  Pending Labs     Order Current Status    Blood Culture - Blood, Arm, Left Preliminary result    Blood Culture - Blood, Arm, Right Preliminary result           Risk for Readmission (LACE) Score: 9 (2/5/2021  6:00 AM)        Time: I spent  more than 35 minutes on this discharge activity which included: face-to-face encounter with the patient, reviewing the data in the system, coordination of the care with the nursing staff as well as consultants, documentation, and entering orders.    Care coordination with nursing and  for discharge planning        Hector Dillon MD  02/05/21  15:35 EST

## 2021-02-05 NOTE — PROGRESS NOTES
Continued Stay Note   Low     Patient Name: Viet Worley  MRN: [id number]  Today's Date: 2/5/2021    Admit Date: 2/3/2021    Discharge Plan     Row Name 02/05/21 1040       Plan    Plan  Dc Plan: Home with daughter, Lydia Gilmore. Current IV antibiotics. PCP is [facility] [place], No Home O2.    Plan Comments  O2 last pm at 2L pnc.      chart review only.             Ishmael Ryan RN

## 2021-02-05 NOTE — PLAN OF CARE
Goal Outcome Evaluation:  Plan of Care Reviewed With: patient     Outcome Summary: pt on room air while awake. patient has no complaints of pain. will continue to monitor

## 2021-02-05 NOTE — PLAN OF CARE
Goal Outcome Evaluation:  Plan of Care Reviewed With: patient  Progress: improving  Outcome Summary: pt was on room air while awake, sat dropped when he fell asleep, put on 2L n.c. for bedtime, pt had no complaints during the shift and tolerated meds well

## 2021-02-06 ENCOUNTER — READMISSION MANAGEMENT (OUTPATIENT)
Dept: CALL CENTER | Facility: HOSPITAL | Age: 71
End: 2021-02-06

## 2021-02-06 NOTE — OUTREACH NOTE
Prep Survey      Responses   [facility] facility patient discharged from?  Low   Is LACE score < 7 ?  No   Emergency Room discharge w/ pulse ox?  No   Eligibility  Readm Mgmt   Discharge diagnosis  Aspiration pneumonia due to vomitus, COPD    Does the patient have one of the following disease processes/diagnoses(primary or secondary)?  COPD/Pneumonia   Does the patient have Home health ordered?  No   Is there a DME ordered?  Yes   What DME was ordered?  Home with home oxygen from [employer]   Comments regarding appointments  Needs f/u scheduled   Medication alerts for this patient  continue aspirin   Prep survey completed?  Yes          Rafaela Rodriguez RN

## 2021-02-08 ENCOUNTER — READMISSION MANAGEMENT (OUTPATIENT)
Dept: CALL CENTER | Facility: HOSPITAL | Age: 71
End: 2021-02-08

## 2021-02-08 LAB
BACTERIA SPEC AEROBE CULT: NORMAL
BACTERIA SPEC AEROBE CULT: NORMAL

## 2021-02-08 NOTE — OUTREACH NOTE
COPD/PN Week 1 Survey      Responses   [facility] patient discharged from?  Low   Does the patient have one of the following disease processes/diagnoses(primary or secondary)?  COPD/Pneumonia   Was the primary reason for admission:  Pneumonia   Week 1 attempt successful?  No   Unsuccessful attempts  Attempt 1          More To RN

## 2021-02-08 NOTE — PROGRESS NOTES
Case Management Discharge Note      Final Note: Home O2 per [facility].          Durable Medical Equipment Coordination complete    Service Provider Selected Services Address Phone Fax Patient Preferred    [facility] [employer] [facility] [facility]  Durable Medical Equipment [address], [place] [address] [phone] --                       Final Discharge Disposition Code: 01 - home or self-care

## 2021-02-09 ENCOUNTER — READMISSION MANAGEMENT (OUTPATIENT)
Dept: CALL CENTER | Facility: HOSPITAL | Age: 71
End: 2021-02-09

## 2021-02-09 NOTE — OUTREACH NOTE
COPD/PN Week 1 Survey      Responses   [facility] patient discharged from?  Low   Does the patient have one of the following disease processes/diagnoses(primary or secondary)?  COPD/Pneumonia   Was the primary reason for admission:  Pneumonia   Week 1 attempt successful?  Yes   Call start time  1729   Revoke  Decline to participate [daughter was upset with care at [facility]]   Call end time  1730   Discharge diagnosis  Aspiration pneumonia due to vomitus, COPD    Is patient permission given to speak with other caregiver?  Yes   List who call center can speak with  nemo [place]   Person spoke with today (if not patient) and relationship  daughter- Jenna          Isela Watson RN

## 2021-09-30 ENCOUNTER — OFFICE VISIT (OUTPATIENT)
Dept: PODIATRY | Facility: CLINIC | Age: 71
End: 2021-09-30

## 2021-09-30 VITALS
HEIGHT: 74 IN | BODY MASS INDEX: 21.82 KG/M2 | DIASTOLIC BLOOD PRESSURE: 64 MMHG | HEART RATE: 66 BPM | WEIGHT: 170 LBS | SYSTOLIC BLOOD PRESSURE: 106 MMHG

## 2021-09-30 DIAGNOSIS — L60.3 ONYCHODYSTROPHY: Primary | ICD-10-CM

## 2021-09-30 PROCEDURE — 11721 DEBRIDE NAIL 6 OR MORE: CPT | Performed by: PODIATRIST

## 2021-09-30 PROCEDURE — 99202 OFFICE O/P NEW SF 15 MIN: CPT | Performed by: PODIATRIST

## 2024-01-01 ENCOUNTER — HOSPITAL ENCOUNTER (EMERGENCY)
Facility: HOSPITAL | Age: 74
End: 2024-06-30
Attending: EMERGENCY MEDICINE | Admitting: EMERGENCY MEDICINE
Payer: MEDICARE

## 2024-01-01 DIAGNOSIS — I46.9 CARDIAC ARREST: Primary | ICD-10-CM

## 2024-01-01 PROCEDURE — 99285 EMERGENCY DEPT VISIT HI MDM: CPT

## 2024-01-01 PROCEDURE — 94799 UNLISTED PULMONARY SVC/PX: CPT
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
